# Patient Record
Sex: FEMALE | Race: WHITE | NOT HISPANIC OR LATINO | Employment: OTHER | URBAN - METROPOLITAN AREA
[De-identification: names, ages, dates, MRNs, and addresses within clinical notes are randomized per-mention and may not be internally consistent; named-entity substitution may affect disease eponyms.]

---

## 2021-01-01 ENCOUNTER — RECORDS - HEALTHEAST (OUTPATIENT)
Dept: ADMINISTRATIVE | Facility: CLINIC | Age: 86
End: 2021-01-01

## 2022-01-01 ENCOUNTER — APPOINTMENT (OUTPATIENT)
Dept: GENERAL RADIOLOGY | Facility: CLINIC | Age: 87
DRG: 085 | End: 2022-01-01
Attending: STUDENT IN AN ORGANIZED HEALTH CARE EDUCATION/TRAINING PROGRAM
Payer: COMMERCIAL

## 2022-01-01 ENCOUNTER — HEALTH MAINTENANCE LETTER (OUTPATIENT)
Age: 87
End: 2022-01-01

## 2022-01-01 ENCOUNTER — APPOINTMENT (OUTPATIENT)
Dept: RADIOLOGY | Facility: HOSPITAL | Age: 87
End: 2022-01-01
Attending: EMERGENCY MEDICINE
Payer: COMMERCIAL

## 2022-01-01 ENCOUNTER — APPOINTMENT (OUTPATIENT)
Dept: CT IMAGING | Facility: HOSPITAL | Age: 87
End: 2022-01-01
Attending: EMERGENCY MEDICINE
Payer: COMMERCIAL

## 2022-01-01 ENCOUNTER — HOSPITAL ENCOUNTER (EMERGENCY)
Facility: HOSPITAL | Age: 87
Discharge: HOME | End: 2022-02-02
Attending: EMERGENCY MEDICINE | Admitting: EMERGENCY MEDICINE
Payer: COMMERCIAL

## 2022-01-01 ENCOUNTER — APPOINTMENT (OUTPATIENT)
Dept: CT IMAGING | Facility: HOSPITAL | Age: 87
End: 2022-01-01
Payer: COMMERCIAL

## 2022-01-01 ENCOUNTER — HOSPITAL ENCOUNTER (EMERGENCY)
Facility: HOSPITAL | Age: 87
Discharge: ANOTHER HOSPITAL | End: 2022-05-15
Attending: EMERGENCY MEDICINE | Admitting: EMERGENCY MEDICINE
Payer: COMMERCIAL

## 2022-01-01 ENCOUNTER — HOSPITAL ENCOUNTER (INPATIENT)
Facility: CLINIC | Age: 87
LOS: 4 days | DRG: 085 | End: 2022-05-19
Attending: PSYCHIATRY & NEUROLOGY | Admitting: PSYCHIATRY & NEUROLOGY
Payer: COMMERCIAL

## 2022-01-01 VITALS
DIASTOLIC BLOOD PRESSURE: 57 MMHG | OXYGEN SATURATION: 100 % | SYSTOLIC BLOOD PRESSURE: 119 MMHG | TEMPERATURE: 97 F | WEIGHT: 148 LBS | RESPIRATION RATE: 16 BRPM | HEART RATE: 62 BPM

## 2022-01-01 VITALS
SYSTOLIC BLOOD PRESSURE: 110 MMHG | WEIGHT: 149.03 LBS | HEART RATE: 124 BPM | DIASTOLIC BLOOD PRESSURE: 51 MMHG | TEMPERATURE: 96.4 F | OXYGEN SATURATION: 93 % | RESPIRATION RATE: 24 BRPM

## 2022-01-01 VITALS
DIASTOLIC BLOOD PRESSURE: 74 MMHG | OXYGEN SATURATION: 99 % | RESPIRATION RATE: 35 BRPM | SYSTOLIC BLOOD PRESSURE: 158 MMHG | HEART RATE: 63 BPM | TEMPERATURE: 97.6 F

## 2022-01-01 DIAGNOSIS — I62.9 INTRACRANIAL HEMORRHAGE (H): ICD-10-CM
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
## 2022-01-01 DIAGNOSIS — S00.83XA CONTUSION OF FOREHEAD, INITIAL ENCOUNTER: ICD-10-CM

## 2022-01-01 DIAGNOSIS — T45.515A WARFARIN-INDUCED COAGULOPATHY (H): ICD-10-CM

## 2022-01-01 DIAGNOSIS — S09.90XA INJURY OF HEAD, INITIAL ENCOUNTER: ICD-10-CM

## 2022-01-01 DIAGNOSIS — D68.32 WARFARIN-INDUCED COAGULOPATHY (H): ICD-10-CM

## 2022-01-01 DIAGNOSIS — Z79.01 CHRONIC ANTICOAGULATION: ICD-10-CM

## 2022-01-01 LAB
ABO/RH(D): NORMAL
ALBUMIN SERPL-MCNC: 3.6 G/DL (ref 3.5–5)
ALBUMIN UR-MCNC: NEGATIVE MG/DL
ALP SERPL-CCNC: 97 U/L (ref 45–120)
ALT SERPL W P-5'-P-CCNC: 11 U/L (ref 0–45)
ANION GAP SERPL CALCULATED.3IONS-SCNC: 12 MMOL/L (ref 5–18)
ANTIBODY SCREEN: NEGATIVE
APPEARANCE UR: CLEAR
AST SERPL W P-5'-P-CCNC: 22 U/L (ref 0–40)
ATRIAL RATE - MUSE: 267 BPM
BASOPHILS # BLD AUTO: 0 10E3/UL (ref 0–0.2)
BASOPHILS NFR BLD AUTO: 0 %
BILIRUB SERPL-MCNC: 1.1 MG/DL (ref 0–1)
BILIRUB UR QL STRIP: NEGATIVE
BUN SERPL-MCNC: 11 MG/DL (ref 8–28)
CALCIUM SERPL-MCNC: 8.8 MG/DL (ref 8.5–10.5)
CHLORIDE BLD-SCNC: 102 MMOL/L (ref 98–107)
CO2 SERPL-SCNC: 21 MMOL/L (ref 22–31)
COLOR UR AUTO: ABNORMAL
CREAT BLD-MCNC: 0.8 MG/DL (ref 0.6–1.1)
CREAT SERPL-MCNC: 0.81 MG/DL (ref 0.6–1.1)
DIASTOLIC BLOOD PRESSURE - MUSE: 81 MMHG
EOSINOPHIL # BLD AUTO: 0.1 10E3/UL (ref 0–0.7)
EOSINOPHIL NFR BLD AUTO: 1 %
ERYTHROCYTE [DISTWIDTH] IN BLOOD BY AUTOMATED COUNT: 15.9 % (ref 10–15)
GFR SERPL CREATININE-BSD FRML MDRD: 70 ML/MIN/1.73M2
GFR SERPL CREATININE-BSD FRML MDRD: >60 ML/MIN/1.73M2
GLUCOSE BLD-MCNC: 120 MG/DL (ref 70–125)
GLUCOSE BLDC GLUCOMTR-MCNC: 118 MG/DL (ref 70–99)
GLUCOSE UR STRIP-MCNC: NEGATIVE MG/DL
HCT VFR BLD AUTO: 25.1 % (ref 35–47)
HGB BLD-MCNC: 13.9 G/DL (ref 11.7–15.7)
HGB BLD-MCNC: 8.1 G/DL (ref 11.7–15.7)
HGB UR QL STRIP: NEGATIVE
HOLD SPECIMEN: NORMAL
IMM GRANULOCYTES # BLD: 0.1 10E3/UL
IMM GRANULOCYTES NFR BLD: 1 %
INR PPP: 1.5 (ref 0.85–1.15)
INR PPP: 2.67 (ref 0.85–1.15)
INR PPP: 3.86 (ref 0.85–1.15)
INTERPRETATION ECG - MUSE: NORMAL
KETONES UR STRIP-MCNC: NEGATIVE MG/DL
LACTATE SERPL-SCNC: 2.3 MMOL/L (ref 0.7–2)
LEUKOCYTE ESTERASE UR QL STRIP: NEGATIVE
LYMPHOCYTES # BLD AUTO: 1.3 10E3/UL (ref 0.8–5.3)
LYMPHOCYTES NFR BLD AUTO: 14 %
MCH RBC QN AUTO: 29.7 PG (ref 26.5–33)
MCHC RBC AUTO-ENTMCNC: 32.3 G/DL (ref 31.5–36.5)
MCV RBC AUTO: 92 FL (ref 78–100)
MONOCYTES # BLD AUTO: 1 10E3/UL (ref 0–1.3)
MONOCYTES NFR BLD AUTO: 11 %
NEUTROPHILS # BLD AUTO: 7.2 10E3/UL (ref 1.6–8.3)
NEUTROPHILS NFR BLD AUTO: 73 %
NITRATE UR QL: NEGATIVE
NRBC # BLD AUTO: 0 10E3/UL
NRBC BLD AUTO-RTO: 0 /100
P AXIS - MUSE: 11 DEGREES
PH UR STRIP: 6 [PH] (ref 5–7)
PLATELET # BLD AUTO: 348 10E3/UL (ref 150–450)
POTASSIUM BLD-SCNC: 3 MMOL/L (ref 3.5–5)
PR INTERVAL - MUSE: NORMAL MS
PROT SERPL-MCNC: 6.6 G/DL (ref 6–8)
QRS DURATION - MUSE: 88 MS
QT - MUSE: 474 MS
QTC - MUSE: 546 MS
R AXIS - MUSE: 67 DEGREES
RBC # BLD AUTO: 2.73 10E6/UL (ref 3.8–5.2)
RBC URINE: <1 /HPF
SARS-COV-2 RNA RESP QL NAA+PROBE: NEGATIVE
SODIUM SERPL-SCNC: 135 MMOL/L (ref 136–145)
SP GR UR STRIP: 1.05 (ref 1–1.03)
SPECIMEN EXPIRATION DATE: NORMAL
SQUAMOUS EPITHELIAL: <1 /HPF
SYSTOLIC BLOOD PRESSURE - MUSE: 147 MMHG
T AXIS - MUSE: 9 DEGREES
TROPONIN I SERPL-MCNC: 0.02 NG/ML (ref 0–0.29)
UROBILINOGEN UR STRIP-MCNC: <2 MG/DL
VENTRICULAR RATE- MUSE: 80 BPM
WBC # BLD AUTO: 9.6 10E3/UL (ref 4–11)
WBC URINE: <1 /HPF

## 2022-01-01 PROCEDURE — 250N000011 HC RX IP 250 OP 636: Performed by: STUDENT IN AN ORGANIZED HEALTH CARE EDUCATION/TRAINING PROGRAM

## 2022-01-01 PROCEDURE — 250N000011 HC RX IP 250 OP 636: Performed by: EMERGENCY MEDICINE

## 2022-01-01 PROCEDURE — 40000276 ZZH STATISTIC RCP TIME ED VENT EA 10 MIN

## 2022-01-01 PROCEDURE — 258N000003 HC RX IP 258 OP 636: Performed by: EMERGENCY MEDICINE

## 2022-01-01 PROCEDURE — 999N000157 HC STATISTIC RCP TIME EA 10 MIN

## 2022-01-01 PROCEDURE — 120N000002 HC R&B MED SURG/OB UMMC

## 2022-01-01 PROCEDURE — 94002 VENT MGMT INPAT INIT DAY: CPT

## 2022-01-01 PROCEDURE — 85610 PROTHROMBIN TIME: CPT | Performed by: PHYSICIAN ASSISTANT

## 2022-01-01 PROCEDURE — 86901 BLOOD TYPING SEROLOGIC RH(D): CPT | Performed by: EMERGENCY MEDICINE

## 2022-01-01 PROCEDURE — 36415 COLL VENOUS BLD VENIPUNCTURE: CPT | Performed by: PHYSICIAN ASSISTANT

## 2022-01-01 PROCEDURE — 99221 1ST HOSP IP/OBS SF/LOW 40: CPT | Mod: AI | Performed by: PSYCHIATRY & NEUROLOGY

## 2022-01-01 PROCEDURE — 84484 ASSAY OF TROPONIN QUANT: CPT | Performed by: EMERGENCY MEDICINE

## 2022-01-01 PROCEDURE — 83605 ASSAY OF LACTIC ACID: CPT | Performed by: EMERGENCY MEDICINE

## 2022-01-01 PROCEDURE — 72125 CT NECK SPINE W/O DYE: CPT

## 2022-01-01 PROCEDURE — 71275 CT ANGIOGRAPHY CHEST: CPT

## 2022-01-01 PROCEDURE — 999N000065 XR CHEST PORT 1 VIEW

## 2022-01-01 PROCEDURE — 85018 HEMOGLOBIN: CPT | Performed by: PHYSICIAN ASSISTANT

## 2022-01-01 PROCEDURE — 96376 TX/PRO/DX INJ SAME DRUG ADON: CPT | Mod: 59

## 2022-01-01 PROCEDURE — 85025 COMPLETE CBC W/AUTO DIFF WBC: CPT | Performed by: EMERGENCY MEDICINE

## 2022-01-01 PROCEDURE — 200N000002 HC R&B ICU UMMC

## 2022-01-01 PROCEDURE — 71045 X-RAY EXAM CHEST 1 VIEW: CPT

## 2022-01-01 PROCEDURE — 999N000065 XR ABDOMEN PORT 1 VIEWS

## 2022-01-01 PROCEDURE — 96368 THER/DIAG CONCURRENT INF: CPT | Mod: 59

## 2022-01-01 PROCEDURE — 80053 COMPREHEN METABOLIC PANEL: CPT | Performed by: EMERGENCY MEDICINE

## 2022-01-01 PROCEDURE — 250N000009 HC RX 250: Performed by: STUDENT IN AN ORGANIZED HEALTH CARE EDUCATION/TRAINING PROGRAM

## 2022-01-01 PROCEDURE — 82565 ASSAY OF CREATININE: CPT

## 2022-01-01 PROCEDURE — 99292 CRITICAL CARE ADDL 30 MIN: CPT

## 2022-01-01 PROCEDURE — 96366 THER/PROPH/DIAG IV INF ADDON: CPT | Mod: 59

## 2022-01-01 PROCEDURE — 99221 1ST HOSP IP/OBS SF/LOW 40: CPT | Mod: GC | Performed by: NEUROLOGICAL SURGERY

## 2022-01-01 PROCEDURE — 70450 CT HEAD/BRAIN W/O DYE: CPT

## 2022-01-01 PROCEDURE — 96365 THER/PROPH/DIAG IV INF INIT: CPT | Mod: 59

## 2022-01-01 PROCEDURE — 99238 HOSP IP/OBS DSCHRG MGMT 30/<: CPT | Mod: GC | Performed by: HOSPITALIST

## 2022-01-01 PROCEDURE — 99222 1ST HOSP IP/OBS MODERATE 55: CPT | Performed by: FAMILY MEDICINE

## 2022-01-01 PROCEDURE — C9803 HOPD COVID-19 SPEC COLLECT: HCPCS

## 2022-01-01 PROCEDURE — 51702 INSERT TEMP BLADDER CATH: CPT

## 2022-01-01 PROCEDURE — 99285 EMERGENCY DEPT VISIT HI MDM: CPT | Mod: 25

## 2022-01-01 PROCEDURE — 96375 TX/PRO/DX INJ NEW DRUG ADDON: CPT | Mod: 59

## 2022-01-01 PROCEDURE — 99232 SBSQ HOSP IP/OBS MODERATE 35: CPT | Mod: GC | Performed by: HOSPITALIST

## 2022-01-01 PROCEDURE — 250N000015 HC RX FACTOR IP MED 636 OP 636: Performed by: EMERGENCY MEDICINE

## 2022-01-01 PROCEDURE — 99231 SBSQ HOSP IP/OBS SF/LOW 25: CPT | Mod: GC | Performed by: HOSPITALIST

## 2022-01-01 PROCEDURE — 81001 URINALYSIS AUTO W/SCOPE: CPT | Performed by: EMERGENCY MEDICINE

## 2022-01-01 PROCEDURE — 74174 CTA ABD&PLVS W/CONTRAST: CPT

## 2022-01-01 PROCEDURE — 99291 CRITICAL CARE FIRST HOUR: CPT | Mod: 25

## 2022-01-01 PROCEDURE — 36415 COLL VENOUS BLD VENIPUNCTURE: CPT | Performed by: EMERGENCY MEDICINE

## 2022-01-01 PROCEDURE — 93005 ELECTROCARDIOGRAM TRACING: CPT | Mod: 59 | Performed by: EMERGENCY MEDICINE

## 2022-01-01 PROCEDURE — 99233 SBSQ HOSP IP/OBS HIGH 50: CPT | Performed by: FAMILY MEDICINE

## 2022-01-01 PROCEDURE — 5A1935Z RESPIRATORY VENTILATION, LESS THAN 24 CONSECUTIVE HOURS: ICD-10-PCS | Performed by: PSYCHIATRY & NEUROLOGY

## 2022-01-01 PROCEDURE — 87635 SARS-COV-2 COVID-19 AMP PRB: CPT | Performed by: EMERGENCY MEDICINE

## 2022-01-01 PROCEDURE — 85610 PROTHROMBIN TIME: CPT | Performed by: EMERGENCY MEDICINE

## 2022-01-01 PROCEDURE — 74018 RADEX ABDOMEN 1 VIEW: CPT | Mod: 26 | Performed by: STUDENT IN AN ORGANIZED HEALTH CARE EDUCATION/TRAINING PROGRAM

## 2022-01-01 PROCEDURE — 99231 SBSQ HOSP IP/OBS SF/LOW 25: CPT | Mod: GC | Performed by: INTERNAL MEDICINE

## 2022-01-01 PROCEDURE — 250N000009 HC RX 250: Performed by: EMERGENCY MEDICINE

## 2022-01-01 RX ORDER — ETOMIDATE 2 MG/ML
20 INJECTION INTRAVENOUS ONCE
Status: COMPLETED | OUTPATIENT
Start: 2022-01-01 | End: 2022-01-01

## 2022-01-01 RX ORDER — DEXTROSE MONOHYDRATE 25 G/50ML
25-50 INJECTION, SOLUTION INTRAVENOUS
Status: DISCONTINUED | OUTPATIENT
Start: 2022-01-01 | End: 2022-01-01 | Stop reason: HOSPADM

## 2022-01-01 RX ORDER — LORAZEPAM 2 MG/ML
1 INJECTION INTRAMUSCULAR EVERY 10 MIN PRN
Status: DISCONTINUED | OUTPATIENT
Start: 2022-01-01 | End: 2022-01-01 | Stop reason: HOSPADM

## 2022-01-01 RX ORDER — ONDANSETRON 2 MG/ML
4 INJECTION INTRAMUSCULAR; INTRAVENOUS ONCE
Status: COMPLETED | OUTPATIENT
Start: 2022-01-01 | End: 2022-01-01

## 2022-01-01 RX ORDER — GLYCOPYRROLATE 0.2 MG/ML
0.1 INJECTION, SOLUTION INTRAMUSCULAR; INTRAVENOUS EVERY 4 HOURS PRN
Status: DISCONTINUED | OUTPATIENT
Start: 2022-01-01 | End: 2022-01-01 | Stop reason: HOSPADM

## 2022-01-01 RX ORDER — FENTANYL CITRATE 50 UG/ML
50 INJECTION, SOLUTION INTRAMUSCULAR; INTRAVENOUS ONCE
Status: COMPLETED | OUTPATIENT
Start: 2022-01-01 | End: 2022-01-01

## 2022-01-01 RX ORDER — NICOTINE POLACRILEX 4 MG
15-30 LOZENGE BUCCAL
Status: DISCONTINUED | OUTPATIENT
Start: 2022-01-01 | End: 2022-01-01 | Stop reason: HOSPADM

## 2022-01-01 RX ORDER — IOPAMIDOL 755 MG/ML
100 INJECTION, SOLUTION INTRAVASCULAR ONCE
Status: COMPLETED | OUTPATIENT
Start: 2022-01-01 | End: 2022-01-01

## 2022-01-01 RX ORDER — PROPOFOL 10 MG/ML
5-75 INJECTION, EMULSION INTRAVENOUS CONTINUOUS
Status: DISCONTINUED | OUTPATIENT
Start: 2022-01-01 | End: 2022-01-01

## 2022-01-01 RX ORDER — PROPOFOL 10 MG/ML
5-75 INJECTION, EMULSION INTRAVENOUS CONTINUOUS
Status: DISCONTINUED | OUTPATIENT
Start: 2022-01-01 | End: 2022-01-01 | Stop reason: HOSPADM

## 2022-01-01 RX ORDER — MORPHINE SULFATE 2 MG/ML
1-2 INJECTION, SOLUTION INTRAMUSCULAR; INTRAVENOUS
Status: DISCONTINUED | OUTPATIENT
Start: 2022-01-01 | End: 2022-01-01

## 2022-01-01 RX ORDER — PROPOFOL 10 MG/ML
50 INJECTION, EMULSION INTRAVENOUS ONCE
Status: COMPLETED | OUTPATIENT
Start: 2022-01-01 | End: 2022-01-01

## 2022-01-01 RX ORDER — FENTANYL CITRATE 50 UG/ML
100 INJECTION, SOLUTION INTRAMUSCULAR; INTRAVENOUS ONCE
Status: COMPLETED | OUTPATIENT
Start: 2022-01-01 | End: 2022-01-01

## 2022-01-01 RX ORDER — GLYCOPYRROLATE 0.2 MG/ML
0.2 INJECTION, SOLUTION INTRAMUSCULAR; INTRAVENOUS
Status: COMPLETED | OUTPATIENT
Start: 2022-01-01 | End: 2022-01-01

## 2022-01-01 RX ADMIN — FENTANYL CITRATE 100 MCG: 50 INJECTION, SOLUTION INTRAMUSCULAR; INTRAVENOUS at 21:47

## 2022-01-01 RX ADMIN — Medication 2 MG/HR: at 08:37

## 2022-01-01 RX ADMIN — PHYTONADIONE 10 MG: 10 INJECTION, EMULSION INTRAMUSCULAR; INTRAVENOUS; SUBCUTANEOUS at 20:52

## 2022-01-01 RX ADMIN — FENTANYL CITRATE 100 MCG: 50 INJECTION, SOLUTION INTRAMUSCULAR; INTRAVENOUS at 21:26

## 2022-01-01 RX ADMIN — Medication 2 MG/HR: at 06:16

## 2022-01-01 RX ADMIN — IOPAMIDOL 100 ML: 755 INJECTION, SOLUTION INTRAVENOUS at 20:19

## 2022-01-01 RX ADMIN — LEVETIRACETAM 1342 MG: 100 INJECTION, SOLUTION INTRAVENOUS at 21:12

## 2022-01-01 RX ADMIN — ETOMIDATE 20 MG: 2 INJECTION INTRAVENOUS at 20:48

## 2022-01-01 RX ADMIN — FENTANYL CITRATE 50 MCG: 50 INJECTION, SOLUTION INTRAMUSCULAR; INTRAVENOUS at 20:52

## 2022-01-01 RX ADMIN — PROTHROMBIN, COAGULATION FACTOR VII HUMAN, COAGULATION FACTOR IX HUMAN, COAGULATION FACTOR X HUMAN, PROTEIN C, PROTEIN S HUMAN, AND WATER 1656 UNITS: KIT at 21:01

## 2022-01-01 RX ADMIN — GLYCOPYRROLATE 0.2 MG: 0.2 INJECTION INTRAMUSCULAR; INTRAVENOUS at 08:01

## 2022-01-01 RX ADMIN — PROPOFOL 50 MG: 10 INJECTION, EMULSION INTRAVENOUS at 21:24

## 2022-01-01 RX ADMIN — ONDANSETRON 4 MG: 2 INJECTION INTRAMUSCULAR; INTRAVENOUS at 19:40

## 2022-01-01 RX ADMIN — PROPOFOL 10 MCG/KG/MIN: 10 INJECTION, EMULSION INTRAVENOUS at 00:10

## 2022-01-01 RX ADMIN — PROPOFOL 5 MCG/KG/MIN: 10 INJECTION, EMULSION INTRAVENOUS at 20:54

## 2022-01-01 ASSESSMENT — ACTIVITIES OF DAILY LIVING (ADL)
ADLS_ACUITY_SCORE: 49
ADLS_ACUITY_SCORE: 49
CHANGE_IN_FUNCTIONAL_STATUS_SINCE_ONSET_OF_CURRENT_ILLNESS/INJURY: NO
CONCENTRATING,_REMEMBERING_OR_MAKING_DECISIONS_DIFFICULTY: YES
ADLS_ACUITY_SCORE: 49
DIFFICULTY_EATING/SWALLOWING: NO
ADLS_ACUITY_SCORE: 49
EQUIPMENT_CURRENTLY_USED_AT_HOME: OTHER (SEE COMMENTS)
ADLS_ACUITY_SCORE: 47
ADLS_ACUITY_SCORE: 45
ADLS_ACUITY_SCORE: 49
ADLS_ACUITY_SCORE: 45
TOILETING_ISSUES: NO
ADLS_ACUITY_SCORE: 45
ADLS_ACUITY_SCORE: 45
ADLS_ACUITY_SCORE: 49
ADLS_ACUITY_SCORE: 45
ADLS_ACUITY_SCORE: 49
WALKING_OR_CLIMBING_STAIRS_DIFFICULTY: YES
ADLS_ACUITY_SCORE: 49
ADLS_ACUITY_SCORE: 45
ADLS_ACUITY_SCORE: 45
WEAR_GLASSES_OR_BLIND: NO
ADLS_ACUITY_SCORE: 45
ADLS_ACUITY_SCORE: 47
ADLS_ACUITY_SCORE: 45
NUMBER_OF_TIMES_PATIENT_HAS_FALLEN_WITHIN_LAST_SIX_MONTHS: 2
ADLS_ACUITY_SCORE: 49
ADLS_ACUITY_SCORE: 49
DOING_ERRANDS_INDEPENDENTLY_DIFFICULTY: YES
ADLS_ACUITY_SCORE: 45
FALL_HISTORY_WITHIN_LAST_SIX_MONTHS: YES
ADLS_ACUITY_SCORE: 47
ADLS_ACUITY_SCORE: 49
ADLS_ACUITY_SCORE: 45
ADLS_ACUITY_SCORE: 47
DRESSING/BATHING: BATHING DIFFICULTY, REQUIRES EQUIPMENT;DRESSING DIFFICULTY, REQUIRES EQUIPMENT
ADLS_ACUITY_SCORE: 49
DRESSING/BATHING_DIFFICULTY: YES
ADLS_ACUITY_SCORE: 49
ADLS_ACUITY_SCORE: 45
ADLS_ACUITY_SCORE: 49
ADLS_ACUITY_SCORE: 49

## 2022-01-01 ASSESSMENT — VISUAL ACUITY
OU: OTHER (SEE COMMENT)

## 2022-02-02 NOTE — ED NOTES
Bed: JN-  Expected date: 2/2/22  Expected time:   Means of arrival: Ambulance  Comments:  Jin  86 F  Fall/+thinners

## 2022-02-02 NOTE — ED PROVIDER NOTES
Emergency Department Midlevel Supervisory Note     I personally saw the patient and performed a substantive portion of the visit including all aspects of the medical decision making.    ED Course:  5:29 PM Jazmine Miner PA-C staffed patient with me. I agree with their assessment and plan of management, and I will see the patient.  5:45 PM I met with the patient to introduce myself, gather additional history, perform my initial exam, and discuss the plan.      Brief HPI:     Zenaida Brown is a 86 year old female who presents for evaluation of a fall.     Approximately 30-45 minutes prior to arrival, EMS reports that there was a mechanical fall in the bathroom. The patient was leaning forwards and then fell onto the shower floor, hitting the top of her head. She has been able to ambulate since her fall. No witness. No believed loss of consciousness. The patient is on anticoagulation. Patient has dementia and per her , she is at her baseline.     I, Meenakshi Brown, am serving as a scribe to document services personally performed by Jo Aguirre DO, based on my observations and the provider's statements to me.   I, Jo Aguirre DO, attest that Meenakshi Brown was acting in a scribe capacity, has observed my performance of the services and has documented them in accordance with my direction.    Brief Physical Exam:   Constitutional:  Alert, in no acute distress  EYES: Conjunctivae clear  HENT:  Normocephalic. Hematoma to the right frontal bone.   Respiratory:  Respirations even, unlabored, in no acute respiratory distress  Cardiovascular:  Regular rate and rhythm, good peripheral perfusion  GI: Soft, nondistended, nontender, no palpable masses, no rebound, no guarding   Musculoskeletal:  No edema. No cyanosis. Range of motion major extremities intact. Cervical collar in place.  Integument: Warm, Dry, No erythema, No rash.   Neurologic:  Alert & oriented  Psych: Normal mood and affect       MDM:  Patient  presenting for evaluation of a fall on chronic anticoagulation.  History obtained from patient and .  She was walking to the bathroom, supposed to use her walker at all times, left it at the door.  She went to sit on the toilet and fell forward.  She denies any loss of consciousness, dizziness, chest pain, shortness of a prior to her fall.  Imaging of her head and neck were obtained and were negative for intracranial hemorrhage, fracture or other concerns.  She remained at her baseline while in the ED, ambulatory, discharged with expectant management and return precautions.       1. Chronic anticoagulation    2. Fall, initial encounter    3. Contusion of forehead, initial encounter    4. Injury of head, initial encounter        Labs and Imaging:  Results for orders placed or performed during the hospital encounter of 02/02/22   Head CT w/o contrast    Impression    IMPRESSION:    HEAD CT:  1. Right frontal scalp soft tissue contusion/hematoma without acute intracranial abnormality.    CERVICAL SPINE CT:  1. No acute traumatic injury of the cervical spine.    Cervical spine CT w/o contrast    Impression    IMPRESSION:    HEAD CT:  1. Right frontal scalp soft tissue contusion/hematoma without acute intracranial abnormality.    CERVICAL SPINE CT:  1. No acute traumatic injury of the cervical spine.    Result Value Ref Range    INR 2.67 (H) 0.85 - 1.15   Result Value Ref Range    Hemoglobin 13.9 11.7 - 15.7 g/dL   Extra Red Top Tube   Result Value Ref Range    Hold Specimen JIC    Extra Green Top (Lithium Heparin) Tube   Result Value Ref Range    Hold Specimen JIC    Extra Purple Top Tube   Result Value Ref Range    Hold Specimen JIC      I have reviewed the relevant laboratory and radiology studies    Procedures:  I was present for the key portions of this procedure: none      DO EMILI Xiong [facility] EMERGENCY DEPARTMENT  [address] [place] [address]  [id number]  [phone]     Jo Aguirre,   02/02/22 2118

## 2022-02-02 NOTE — ED PROVIDER NOTES
EMERGENCY DEPARTMENT ENCOUNTER      NAME: Zenaida Brown  AGE: 86 year old female  YOB: 1935  MRN: [id number]  EVALUATION DATE & TIME: 2022  5:02 PM    PCP: Sharron Howard    ED PROVIDER: Jazmine Miner PA-C      Chief Complaint   Patient presents with     Fall         FINAL IMPRESSION:  1. Chronic anticoagulation    2. Fall, initial encounter    3. Contusion of forehead, initial encounter    4. Injury of head, initial encounter          ED COURSE & MEDICAL DECISION MAKIN:06 PM I introduced myself to patient, performed initial HPI and examination.   6:19 PM Discussed imaging, C-collar removed. Discussed plan for discharge with patient and .    Zenaida Brown is a 86 year old female with a PMH of atrial fibrillation on warfarin, CHF, dementia here for evaluation after a fall.      History suggests a mechanical fall.  Patient will not need an evaluation for medical causes of fall on this visit.  History and exam are concerning for the following possible injuries:      Yes  Head Injury / Concussion - Did hit head. Large contusion to forehead. Small abrasion overlying but no active bleeding and no indication for repair. Patient has a history of dementia, at baseline per . On chronic anticoagulation (Warfarin), INR therapeutic (2.67). CT head with no intracranial hemorrhage. Contusion seen without other acute findings. CT c-spine negative.  No  Intra-abdominal injury  No  Intra-thoracic injury  No  Spinal Fracture  No  Extremity Fracture  No  Pelvic / Hip Fracture - Ambulatory with walker or assist of one per baseline.  No  Spinal Cord injury    Work-up is reassuring. Patient's mentation is at baseline. Lives at home independently with . Instructed on at home management with ice, close monitoring. Encouraged close follow up with PCP and instructed on red flags/indications to return to the emergency department. All questions were answered to the best of my ability and  patient/ are agreeable with plan.     MEDICATIONS GIVEN IN THE EMERGENCY:  Medications - No data to display    NEW PRESCRIPTIONS STARTED AT TODAY'S ER VISIT  [unfilled]       =================================================================    HPI    Patient information was obtained from: EMS, patient and     Use of : N/A         Zenaida Brown is a 86 year old female with a pertinent history of CHF, CAD, cardiac pacemaker, chronic atrial fibrillation, hypertension, and GERD who presents to this ED by ambulance for evaluation of fall.     The patient has dementia and per her , she is at her baseline.    Approximately 30-45 minutes prior to arrival, EMS reports that there was a mechanical fall in the bathroom. The patient was leaning forwards and then fell onto the shower floor, hitting the top of her head. She has been able to ambulate since her fall. No witness. No believed loss of consciousness. The patient is on anticoagulation. No symptoms or complaints at this time. The patient's  is on his way.     REVIEW OF SYSTEMS   Review of Systems   Unable to perform ROS: Dementia        PAST MEDICAL HISTORY:  History reviewed. No pertinent past medical history.    PAST SURGICAL HISTORY:  History reviewed. No pertinent surgical history.    CURRENT MEDICATIONS:    acetaminophen-codeine (TYLENOL #3) 300-30 mg per tablet        ALLERGIES:  No Known Allergies    FAMILY HISTORY:  History reviewed. No pertinent family history.    SOCIAL HISTORY:   Social History     Socioeconomic History     Marital status:      Spouse name: Not on file     Number of children: Not on file     Years of education: Not on file     Highest education level: Not on file   Occupational History     Not on file   Tobacco Use     Smoking status: Former Smoker     Smokeless tobacco: Never Used   Substance and Sexual Activity     Alcohol use: Not Currently     Drug use: Never     Sexual activity: Not on  file   Other Topics Concern     Not on file   Social History Narrative     Not on file     Social Determinants of Health     Financial Resource Strain: Not on file   Food Insecurity: Not on file   Transportation Needs: Not on file   Physical Activity: Not on file   Stress: Not on file   Social Connections: Not on file   Intimate Partner Violence: Not on file   Housing Stability: Not on file       VITALS:  BP (!) 158/74   Pulse 63   Temp 97.6  F (36.4  C)   Resp (!) 35   SpO2 99%     PHYSICAL EXAM    Constitutional: Well developed, Well nourished, NAD, GCS 15   HENT: Normocephalic, large contusion to forehead with central abrasion. No laceration or active bleeding.   Neck- In C-collar. No reproducible c-spine tenderness.   Eyes: Conjunctiva normal. PERRL. EOM intact. No photophobia or nystagmsu.  Respiratory: No respiratory distress, speaking in full sentences. Normal breath sounds, No wheezing  Cardiovascular: Normal heart rate, Regular rhythm, No murmurs. Chest wall nontender.    GI: Soft, nontender.     Musculoskeletal: No deformities, Moves all extremities equally. No thoracic or lumbar spine tenderness. Ambulatory with assist of one/walker per baseline.  Integument: Warm, Dry, No erythema, ecchymosis, or rash.  Neurologic: Alert & oriented to self and place; dementia at baseline, Normal sensory function. No focal deficits. Cranial nerves II-XII intact.   Psychiatric: Affect normal, Judgment normal, Mood normal. Cooperative.      LAB:  All pertinent labs reviewed and interpreted.  Results for orders placed or performed during the hospital encounter of 02/02/22   Head CT w/o contrast    Impression    IMPRESSION:    HEAD CT:  1. Right frontal scalp soft tissue contusion/hematoma without acute intracranial abnormality.    CERVICAL SPINE CT:  1. No acute traumatic injury of the cervical spine.    Cervical spine CT w/o contrast    Impression    IMPRESSION:    HEAD CT:  1. Right frontal scalp soft tissue  contusion/hematoma without acute intracranial abnormality.    CERVICAL SPINE CT:  1. No acute traumatic injury of the cervical spine.    Result Value Ref Range    INR 2.67 (H) 0.85 - 1.15   Result Value Ref Range    Hemoglobin 13.9 11.7 - 15.7 g/dL   Extra Red Top Tube   Result Value Ref Range    Hold Specimen JIC    Extra Green Top (Lithium Heparin) Tube   Result Value Ref Range    Hold Specimen JIC    Extra Purple Top Tube   Result Value Ref Range    Hold Specimen JIC        RADIOLOGY:  Reviewed all pertinent imaging. Please see official radiology report.  Cervical spine CT w/o contrast   Final Result   IMPRESSION:      HEAD CT:   1. Right frontal scalp soft tissue contusion/hematoma without acute intracranial abnormality.      CERVICAL SPINE CT:   1. No acute traumatic injury of the cervical spine.       Head CT w/o contrast   Final Result   IMPRESSION:      HEAD CT:   1. Right frontal scalp soft tissue contusion/hematoma without acute intracranial abnormality.      CERVICAL SPINE CT:   1. No acute traumatic injury of the cervical spine.           EKG:    None    PROCEDURES:   None      IAdan Cha, am serving as a scribe to document services personally performed by Jazmine Miner PA-C based on my observation and the provider's statements to me. IJazmine PA-C, attest that Adan Joshua is acting in a scribe capacity, has observed my performance of the services and has documented them in accordance with my direction.    Jazmine Miner PA-C  Emergency Medicine  [facility] EMERGENCY DEPARTMENT  [address] [place] [id number]  [phone]             Jazmine Miner PA-C  02/02/22 7391

## 2022-02-02 NOTE — ED TRIAGE NOTES
Fell at home while trying to stand from toilet. Pt fell from standing position forward hitting her head on the base of a flush to the floor shower. Pt is on coumadin. EMS placed cervical collar on. Pt has large size swelling and bruise on right side of head. Pt has small laceration over swelling. At baseline patient is confused with known dementia.  is primary care giver. EMS reports that  and Patient were at an  today filling out POA paperwork.

## 2022-02-03 NOTE — ED NOTES
Plan to discharge home.  educated about signs of concussion and when to come back in. Education provided. No questions at this time.

## 2022-02-03 NOTE — DISCHARGE INSTRUCTIONS
CT of the head and neck are reassuring.  June's INR is 2.67 today.    Use ice for pain and swelling.  You can use tylenol as needed for pain.    Continue to monitor symptoms at home.  Follow up in clinic on Friday for re-check.    Return to the emergency department if you are noticing fevers, increase confusion, new weakness, vomiting, neck stiffness, or any other concerning symptoms. We would be happy to see you.

## 2022-05-15 PROBLEM — S06.5XAA SDH (SUBDURAL HEMATOMA) (H): Status: ACTIVE | Noted: 2022-01-01

## 2022-05-16 NOTE — PROGRESS NOTES
Major Shift Events:  Pt extubated @ 0850 for comfort cares. More sleepy throughout day and currently not arousing to voice. O2 sats 60-70s. HR 120s. Morphine gtt @ 2. Family at bedside.  Plan: continue comfort cares.  For vital signs and complete assessments, please see documentation flowsheets.

## 2022-05-16 NOTE — CONSULTS
"[facility]       NEUROSURGERY CONSULTATION NOTE    This patient was directly admitted by Dr. Rocha from the Neurocritical Care service.    Reason for Consultation  Acute 2.4 cm left convexity subdural hematoma    HPI:  Zenaida Brown is a 86 year old female (DNR) with history of Alzheimer's dementia, CAD, afib and mitral valve replacement on warfarin, and hypertension who presented to [facility] ED after falling in the shower.  While at [facility], head CT was obtained demonstrating 2.4 cm left convexity subdural hematoma.  Ranulfo Mendez and Aj of [facility] were contacted, and Dr. Rocha directly admitted the patient to the  Neuro ICU for further cares.  The patient demonstrated \"increased obtundation\" at [facility], prompting intubation prior to transfer.    At the time of evaluation, the patient is intubated and examined off of sedation.  She opens eyes to noxious stimulus, localizes the LUE, and withdraws the right hemibody and LLE.    The patient's POA is her daughter Janis (number in chart).  Janis confirms that the patient would not wish to have chest compressions or to undergo drastic surgical measures.    INR was 3.86 on presentation to [facility] and was corrected with vitamin K.  Last INR at 2111 was 1.50.    PAST MEDICAL HISTORY: No past medical history on file.    PAST SURGICAL HISTORY: No past surgical history on file.    FAMILY HISTORY: No family history on file.    SOCIAL HISTORY:   Social History     Tobacco Use    Smoking status: Former Smoker    Smokeless tobacco: Never Used   Substance Use Topics    Alcohol use: Not Currently       MEDICATIONS:  No current outpatient medications on file.       Allergies:  No Known Allergies    ROS: 10 point ROS of systems including Constitutional, Eyes, Respiratory, Cardiovascular, Gastroenterology, Genitourinary, Integumentary, Muscularskeletal, Psychiatric were all negative except for pertinent positives noted in " my HPI.    Physical exam:   Blood pressure 116/52, pulse 59, temperature (!) 96.4  F (35.8  C), temperature source Axillary, resp. rate 16, weight 67.6 kg (149 lb 0.5 oz), SpO2 100 %.  CV: RRR, paced on telemetry  PULM: intubated, mechanically ventilated  ABD: soft, non-distended  NEUROLOGIC:  - GCS 8 (E2 Vt M5)  - Initially sedated with propofol @ 10/hr on arrival. Examined off sedation.  - PERRL, +cough, +corneal  - Localizes LUE  - Withdraws LLE  - Withdraws RUE and RLE      IMAGING:  CT head 5/15/22:  1.  Acute left holohemispheric subdural hemorrhage measuring up to 24 mm in diameter with areas of internal hyperattenuation suggesting continued active hemorrhage. Associated mass effect produces subtotal effacement left lateral and third ventricles,   early left temporal uncal herniation, and 7 mm rightward midline shift.  2.  Acute bilateral tentorial subdural hematomas within the left posterior parafalcine extension.  3.  Small acute subarachnoid hemorrhage over the right lateral temporal sulci.  4.  No acute calvarial fracture or scalp hematoma.    CT cervical spine 5/15/22:  1.  No evidence for acute fracture or posttraumatic subluxation of the cervical spine by CT imaging.  2.  No high-grade spinal canal or neural foraminal stenosis.  3.  Mild pulmonary emphysema.    CTA CAP 5/15/22:  1.  No acute traumatic visceral, vascular, or acute osseous abnormality.  2.  Emphysema.  3.  Moderately enlarged heart status post median sternotomy.  4.  Diverticulosis. No diverticulitis, colitis, or obstruction.  5.  Right hepatic cyst, no further characterization or follow-up required.    LABS:   Lab Results   Component Value Date    WBC 9.6 05/15/2022     Lab Results   Component Value Date    RBC 2.73 05/15/2022     Lab Results   Component Value Date    HGB 8.1 05/15/2022     Lab Results   Component Value Date    HCT 25.1 05/15/2022     Lab Results   Component Value Date    MCV 92 05/15/2022     Lab Results   Component  Value Date    MCH 29.7 05/15/2022     Lab Results   Component Value Date    MCHC 32.3 05/15/2022     Lab Results   Component Value Date    RDW 15.9 05/15/2022     Lab Results   Component Value Date     05/15/2022       Last Comprehensive Metabolic Panel:  Sodium   Date Value Ref Range Status   05/15/2022 135 (L) 136 - 145 mmol/L Final     Potassium   Date Value Ref Range Status   05/15/2022 3.0 (L) 3.5 - 5.0 mmol/L Final     Chloride   Date Value Ref Range Status   05/15/2022 102 98 - 107 mmol/L Final     Carbon Dioxide (CO2)   Date Value Ref Range Status   05/15/2022 21 (L) 22 - 31 mmol/L Final     Anion Gap   Date Value Ref Range Status   05/15/2022 12 5 - 18 mmol/L Final     Glucose   Date Value Ref Range Status   05/15/2022 120 70 - 125 mg/dL Final     GLUCOSE BY METER POCT   Date Value Ref Range Status   05/15/2022 118 (H) 70 - 99 mg/dL Final     Urea Nitrogen   Date Value Ref Range Status   05/15/2022 11 8 - 28 mg/dL Final     Creatinine   Date Value Ref Range Status   05/15/2022 0.81 0.60 - 1.10 mg/dL Final     Creatinine POCT   Date Value Ref Range Status   05/15/2022 0.8 0.6 - 1.1 mg/dL Final     GFR Estimate   Date Value Ref Range Status   05/15/2022 70 >60 mL/min/1.73m2 Final     Comment:     Effective December 21, 2021 eGFRcr in adults is calculated using the 2021 CKD-EPI creatinine equation which includes age and gender (Jojo et al., NEJM, DOI: 10.1056/HOMAjq5667766)   01/08/2021 54 (L) >60 mL/min/1.73m2 Final     GFR, ESTIMATED POCT   Date Value Ref Range Status   05/15/2022 >60 >60 mL/min/1.73m2 Final     Calcium   Date Value Ref Range Status   05/15/2022 8.8 8.5 - 10.5 mg/dL Final       INR   Date Value Ref Range Status   05/15/2022 1.50 (H) 0.85 - 1.15 Final      No results found for: PTT     ASSESSMENT:  86 year old DNR female with history of afib and MVR on warfarin (INR 3.86 -> 1.50), Alzheimer's dementia, and hypertension presenting after fall in shower with 2.4 cm left subdural  hematoma.      In discussion with the patient's POA (daughter Janis), their family is in agreement that surgery for subdural hematoma evacuation should not be pursued at this time.  I extensively discussed the risks, benefits, and alternatives with them, and they wish to pursue medical management at this time.    RECOMMENDATIONS:  - No neurosurgical intervention at this time per family wishes  - Frequent neuro checks  - SBP <140  - INR <1.5, platelets >100k  - Repeat head CT 6 hours after time of initial scan  - AED dosing per NCC  - No anticoagulating/antiplatelet/fibrinolytic medications  - Avoid sedating medications that would alter a neurological examination    The patient was discussed with Dr. Freire, neurosurgery chief resident, and he agrees with the above.    Kurtis Ruiz M.D.  Neurosurgery Resident, PGY-3    Please contact neurosurgery resident on call with questions.    Dial * * [phone], enter [phone] when prompted.       Comorbid conditions:  Clinically Significant Risk Factors Present on Admission              # Coagulation Defect: INR = 1.50 (Ref range: 0.85 - 1.15) and/or PTT = N/A on admission, will monitor for bleeding   # Coma: based on GCS score of <8    # Compression of brain   I have seen this patient with the resident and formulated a plan and agree with this note.  AMP

## 2022-05-16 NOTE — ED NOTES
Patient brought monitored to CT by writer. Patient experienced some dry heaving, patient suctioned. Patient slightly agitated during exam, writer remained in CT room with patient with lead gown, able to redirect and comfort patient.

## 2022-05-16 NOTE — SIGNIFICANT EVENT
SPIRITUAL HEALTH SERVICES Significant Event  [employer] Sacrament of ANOINTING  [facility] 4a    Pt anointed by Father Renata Caba   Pager [phone]

## 2022-05-16 NOTE — ED PROVIDER NOTES
EMERGENCY DEPARTMENT NOTE     Name: Zenaida Brown    Age/Sex: 86 year old female   MRN: [id number]   Evaluation Date & Time:  5/15/2022  7:30 PM    PCP:    Sharron Howard   ED Provider: Obi Celis D.O.       CHIEF COMPLAINT    Fall       DIAGNOSIS & DISPOSITION     1. Intracranial hemorrhage (H)    2. Warfarin-induced coagulopathy (H)      DISPOSITION: Transfer to the [facility] intensive care unit/U of  intensivist and neurosurgy    At the conclusion of the encounter I discussed the results of all of the tests and the disposition. The questions were answered. The patient or family acknowledged understanding and was agreeable with the care plan.    TOTAL CRITICAL CARE TIME (EXCLUDING PROCEDURES): 90minutes    PROCEDURES:     PROCEDURE: Rapid Sequence Intubation   INDICATIONS: Respiratory Failure and Airway Protection   PROCEDURE PROVIDER: Dr Obi Celis   CONSENT: Risks, benefits and alternatives were discussed with and Verbal consent was obtained from Spouse.   PROCEDURE SPECIFIC CHECKLIST COMPLETED: Yes   TIME OUT: Universal protocol was followed. TIME OUT conducted just prior to starting procedure confirmed patient identity, site/side, procedure, patient position, and availability of correct equipment. Yes   MEDICATIONS: Etomidate, 20 mg, IV and Succinylcholine, 200 mg IV   TUBE DETAILS: 7.5 tube, at 25 cm at the teeth   EQUIPMENT USED: Glidescope, size 4   POST-INTUBATION ASSESSMENT/NOTE: Difficulty of intubation:  Easy, straightforward    Post-intubation pulmonary exam:  equal BS and absent over the epigastrium    ET Tube placement was confirmed with:  auscultation with good, equal bilateral breath sounds, absence of breath sounds over the epigastrium, fog in the tube, colorimetric CO2 detector (good color change) and pulse oximeter readings stable or improving    Lowest oxygen saturation was 99%    Monitoring consisted of:  heart rate, cardiac monitor, continuous pulse oximeter,  frequent blood pressure checks, IV access, constant attendance by RN until patient is recovered and constant attendance by MD until patient is stable     COMPLICATIONS: Patient tolerated procedure well, without complication       EMERGENCY DEPARTMENT COURSE/MEDICAL DECISION MAKING   7:30 PM I met with the patient to gather history and to perform my initial exam.  We discussed treatment options and the plan for care while in the Emergency Department.    8:40 PM I spoke with Dr. Mendez, Neurosurgery.    8:55 PM I spoke with Dr. Rocha, Neuro Intensivist.    Zenaida Brown is a 86 year old female with relevant past history of chronic atrial fibrillation, congestive heart failure, cardiac pacemaker, hypertension and coronary artery disease, who presents to the emergency department for evaluation after  a fall.    Per the patient's daughter, the patient was in the shower when she had a fall. The patient's  then helped her to their room where she fell again. The patient's  called the  to help pick her up. Per the , initially the patient appeared fine and shortly after the patient developed chest pain and nausea. In the ED the patient is heaving and vomiting. Patient is on warfarin.    Triage note reviewed:   Vital signs:BP (!) 147/65   Pulse 59   Temp 97  F (36.1  C) (Axillary)   Resp 17   Wt 67.1 kg (148 lb)   SpO2 100%   Pertinent physical exam findings:  General: Encephalopathic but follows commands easily GCS was 14-15  Cardiac: Regular rate and rhythm S1-S2 without murmur rub  Pulmonary: Lungs are clear to ascultation bilaterally with good breath sounds  Chest wall: Atraumatic  Abdomen: Soft, mild periumbilical tenderness.  No guarding or peritoneal signs.  No abdominal wall ecchymosis.  Neuro: Cranial nerves 2 through 12 are intact.  5 out of 5 motor strength upper and lower extremities.Diagnostic studies:  Imaging:  XR Chest Port 1 View   Final Result   IMPRESSION: Endotracheal tube  tip 4 cm from the pedro luis. Slight improved aeration at the left base compared to previous, otherwise no change.      CTA Chest Abdomen Pelvis w Contrast   Final Result   IMPRESSION:   1.  No acute traumatic visceral, vascular, or acute osseous abnormality.   2.  Emphysema.   3.  Moderately enlarged heart status post median sternotomy.   4.  Diverticulosis. No diverticulitis, colitis, or obstruction.   5.  Right hepatic cyst, no further characterization or follow-up required.         Cervical spine CT w/o contrast   Final Result   IMPRESSION:   1.  No evidence for acute fracture or posttraumatic subluxation of the cervical spine by CT imaging.   2.  No high-grade spinal canal or neural foraminal stenosis.   3.  Mild pulmonary emphysema.      Head CT w/o contrast   Final Result   Addendum 1 of 1   ADDENDUM: There is also an acute right posterior temporo-occipital    convexity subdural hematoma measuring 4 mm.      Final   IMPRESSION:   1.  Acute left holohemispheric subdural hemorrhage measuring up to 24 mm in diameter with areas of internal hyperattenuation suggesting continued active hemorrhage. Associated mass effect produces subtotal effacement left lateral and third ventricles,    early left temporal uncal herniation, and 7 mm rightward midline shift.   2.  Acute bilateral tentorial subdural hematomas within the left posterior parafalcine extension.   3.  Small acute subarachnoid hemorrhage over the right lateral temporal sulci.   4.  No acute calvarial fracture or scalp hematoma.         Results discussed with Obi Celis on 5/15/2022 8:19PM.      XR Chest Port 1 View   Final Result   IMPRESSION: Question left lower lobe infiltrate and some pleural fluid. No pneumothorax. No gross displaced rib fracture.         Lab:  Labs Ordered and Resulted from Time of ED Arrival to Time of ED Departure   COMPREHENSIVE METABOLIC PANEL - Abnormal       Result Value    Sodium 135 (*)     Potassium 3.0 (*)     Chloride 102       Carbon Dioxide (CO2) 21 (*)     Anion Gap 12      Urea Nitrogen 11      Creatinine 0.81      Calcium 8.8      Glucose 120      Alkaline Phosphatase 97      AST 22      ALT 11      Protein Total 6.6      Albumin 3.6      Bilirubin Total 1.1 (*)     GFR Estimate 70     INR - Abnormal    INR 3.86 (*)    LACTIC ACID WHOLE BLOOD - Abnormal    Lactic Acid 2.3 (*)    ROUTINE UA WITH MICROSCOPIC REFLEX TO CULTURE - Abnormal    Color Urine Light Yellow      Appearance Urine Clear      Glucose Urine Negative      Bilirubin Urine Negative      Ketones Urine Negative      Specific Gravity Urine 1.049 (*)     Blood Urine Negative      pH Urine 6.0      Protein Albumin Urine Negative      Urobilinogen Urine <2.0      Nitrite Urine Negative      Leukocyte Esterase Urine Negative      RBC Urine <1      WBC Urine <1      Squamous Epithelials Urine <1     CBC WITH PLATELETS AND DIFFERENTIAL - Abnormal    WBC Count 9.6      RBC Count 2.73 (*)     Hemoglobin 8.1 (*)     Hematocrit 25.1 (*)     MCV 92      MCH 29.7      MCHC 32.3      RDW 15.9 (*)     Platelet Count 348      % Neutrophils 73      % Lymphocytes 14      % Monocytes 11      % Eosinophils 1      % Basophils 0      % Immature Granulocytes 1      NRBCs per 100 WBC 0      Absolute Neutrophils 7.2      Absolute Lymphocytes 1.3      Absolute Monocytes 1.0      Absolute Eosinophils 0.1      Absolute Basophils 0.0      Absolute Immature Granulocytes 0.1      Absolute NRBCs 0.0     INR - Abnormal    INR 1.50 (*)    TROPONIN I - Normal    Troponin I 0.02     ISTAT CREATININE POCT - Normal    Creatinine POCT 0.8      GFR, ESTIMATED POCT >60     COVID-19 VIRUS (CORONAVIRUS) BY PCR - Normal    SARS CoV2 PCR Negative     TYPE AND SCREEN, ADULT    ABO/RH(D) A POS      Antibody Screen Negative      SPECIMEN EXPIRATION DATE [id number]     ABO/RH TYPE AND SCREEN      Interventions: RSI intubation for airway control with etomidate and succinylcholine.  Patient was given sedation with  fentanyl and propofol.  Patient received  prothrombin concentrate and vitamin K for reversal of warfarin induced coagulopathy  Medical decision making: Discussion with the patient's  and daughter they indicated the patient  may have a POLST that indicates DNR/DNI.  I discussed the current situation and and if the patient  have chance of recovery back to her prior baseline they would agree to short-term intubation.  The patient had increased obtundation on return from CT was intubated for airway protection.  Patient's blood pressures have been mostly 110 and 130.  Latest blood pressure was after placement of NG tube.  EMS is now here to transport the patient to the ICU at [facility].  In discussion with Dr. Rocha the intensivist Dr. Vargas for neurosurgery the plan will be to go to the ICU with decision for operative intervention.  EMS will monitor blood pressure on route and maintain systolic blood pressure less than 140.    ED INTERVENTIONS     Medications   HOLD: warfarin (COUMADIN) therapy (has no administration in time range)   etomidate (AMIDATE) injection 20 mg (has no administration in time range)   succinylcholine (ANECTINE) injection 200 mg (has no administration in time range)   fentaNYL (PF) (SUBLIMAZE) injection 50 mcg (has no administration in time range)   propofol (DIPRIVAN) infusion (has no administration in time range)   ondansetron (ZOFRAN) injection 4 mg (4 mg Intravenous Given 5/15/22 1940)   iopamidol (ISOVUE-370) solution 100 mL (100 mLs Intravenous Given 5/15/22 2019)   phytonadione 10 mg in sodium chloride 0.9 % 50 mL intermittent infusion (0 mg Intravenous Stopped 5/15/22 2122)   prothrombin 4 factor complex concentrate (KCENTRA) infusion 1,656 Units (1,656 Units Intravenous Given 5/15/22 2101)   levETIRAcetam (KEPPRA) 1,342 mg in sodium chloride 0.9 % 100 mL intermittent infusion (0 mg Intravenous Stopped 5/15/22 2134)   propofol (DIPRIVAN) injection 10 mg/mL vial  (50 mg Intravenous Given 5/15/22 2124)   fentaNYL (PF) (SUBLIMAZE) injection 100 mcg (100 mcg Intravenous Given 5/15/22 2126)       DISCHARGE MEDICATIONS        Review of your medicines      UNREVIEWED medicines. Ask your doctor about these medicines      Dose / Directions   acetaminophen-codeine 300-30 MG per tablet  Commonly known as: TYLENOL w/CODEINE #3  Used for: Chronic right shoulder pain, Arthritis      Dose: 1-2 tablet  [ACETAMINOPHEN-CODEINE (TYLENOL #3) 300-30 MG PER TABLET] Take 1-2 tablets by mouth every 6 (six) hours as needed for pain.  Quantity: 15 tablet  Refills: 0              INFORMATION SOURCE AND LIMITATIONS    History/Exam limitations: She had altered mental status over baseline dementia  Patient information was obtained from: Patient's daughter and   Use of : N/A    HISTORY OF PRESENT ILLNESS   Zenaida Brown is a 86 year old year old female with a relevant past history of chronic atrial fibrillation, congestive heart failure, cardiac pacemaker, hypertension and coronary artery disease, who presents to this ED by private car for evaluation of a fall.    Per Chart Review,  2/2/2022 The patient presented to [facility] ED for evaluation of a fall. Final Impression: 1. Chronic anticoagulation 2. Fall, initial encounter 3. Contusion of forehead, initial encounter 4. Injury of head, initial encounter.    Per the patient's daughter, the patient was in the shower when she had a fall. The patient's  then helped her to their room where she fell again. The patient's  called the  to help pick her up. Per the , initially the patient appeared fine and shortly after the patient developed chest pain and nausea. In the ED the patient is heaving and vomiting. Patient is on warfarin.    REVIEW OF SYSTEMS:   Limited from patient's secondary to encephalopathy and underlying dementia.  Review of systems was obtained from  and daughter  Constitutional: Negative  for fever.   HENT: Negative for URI symptoms   Cardiac: Positive for chest pain. Negative for  or syncope  Respiratory: Negative for cough and shortness of breath.    Gastrointestinal: Positive for vomiting and nausea.  Patient complained of some possible abdominal pain   Neurological: Negative for dizziness, headache, syncope, speech difficulty, unilateral weakness or imbalance with walking.   Hematological: Patient is on warfarin.  Psychiatric/Behavioral: Positive for increased confusion over baseline dementia.       PATIENT HISTORY   No past medical history on file.  There is no problem list on file for this patient.    No past surgical history on file.  Social Histrory  Smoking:None  Alcohol Use:None  No Known Allergies      OUTPATIENT MEDICATIONS     New Prescriptions    No medications on file      Vitals:    05/15/22 2210 05/15/22 2215 05/15/22 2220 05/15/22 2225   BP: 130/53 115/56 (!) 147/65    Pulse: 59 59 67 59   Resp: 23 19 26 17   Temp:       TempSrc:       SpO2: 100% 100% 100% 100%   Weight:           Physical Exam   Constitutional: Patient on initial exam mildly encephalopathic.  She would answer to her name and follow commands easily.  Patient on initial exam had active retching   HEENT:    Head: Atraumatic.   Neck:  Neck supple.   Cardiovascular: Normal rate, regular rhythm and normal heart sounds.    Pulmonary/Chest: Normal effort  and breath sounds normal.   Abdominal: Soft.  No focal periumbilical tenderness.  No abdominal wall bruising  Musculoskeletal: Normal range of motion.   Neurological: Alert and oriented to person, Normal strength No cranial nerve deficit .   Skin: No ecchymotic areas on the chest wall, abdominal wall back or flank      DIAGNOSTICS    LABORATORY FINDINGS (REVIEWED AND INTERPRETED):  Labs Ordered and Resulted from Time of ED Arrival to Time of ED Departure   COMPREHENSIVE METABOLIC PANEL - Abnormal       Result Value    Sodium 135 (*)     Potassium 3.0 (*)     Chloride 102       Carbon Dioxide (CO2) 21 (*)     Anion Gap 12      Urea Nitrogen 11      Creatinine 0.81      Calcium 8.8      Glucose 120      Alkaline Phosphatase 97      AST 22      ALT 11      Protein Total 6.6      Albumin 3.6      Bilirubin Total 1.1 (*)     GFR Estimate 70     INR - Abnormal    INR 3.86 (*)    LACTIC ACID WHOLE BLOOD - Abnormal    Lactic Acid 2.3 (*)    ROUTINE UA WITH MICROSCOPIC REFLEX TO CULTURE - Abnormal    Color Urine Light Yellow      Appearance Urine Clear      Glucose Urine Negative      Bilirubin Urine Negative      Ketones Urine Negative      Specific Gravity Urine 1.049 (*)     Blood Urine Negative      pH Urine 6.0      Protein Albumin Urine Negative      Urobilinogen Urine <2.0      Nitrite Urine Negative      Leukocyte Esterase Urine Negative      RBC Urine <1      WBC Urine <1      Squamous Epithelials Urine <1     CBC WITH PLATELETS AND DIFFERENTIAL - Abnormal    WBC Count 9.6      RBC Count 2.73 (*)     Hemoglobin 8.1 (*)     Hematocrit 25.1 (*)     MCV 92      MCH 29.7      MCHC 32.3      RDW 15.9 (*)     Platelet Count 348      % Neutrophils 73      % Lymphocytes 14      % Monocytes 11      % Eosinophils 1      % Basophils 0      % Immature Granulocytes 1      NRBCs per 100 WBC 0      Absolute Neutrophils 7.2      Absolute Lymphocytes 1.3      Absolute Monocytes 1.0      Absolute Eosinophils 0.1      Absolute Basophils 0.0      Absolute Immature Granulocytes 0.1      Absolute NRBCs 0.0     INR - Abnormal    INR 1.50 (*)    TROPONIN I - Normal    Troponin I 0.02     ISTAT CREATININE POCT - Normal    Creatinine POCT 0.8      GFR, ESTIMATED POCT >60     COVID-19 VIRUS (CORONAVIRUS) BY PCR - Normal    SARS CoV2 PCR Negative     TYPE AND SCREEN, ADULT    ABO/RH(D) A POS      Antibody Screen Negative      SPECIMEN EXPIRATION DATE [id number]     ABO/RH TYPE AND SCREEN         IMAGING (REVIEWED AND INTERPRETED):  XR Chest Port 1 View   Final Result   IMPRESSION: Endotracheal tube tip 4 cm  from the pedro luis. Slight improved aeration at the left base compared to previous, otherwise no change.      CTA Chest Abdomen Pelvis w Contrast   Final Result   IMPRESSION:   1.  No acute traumatic visceral, vascular, or acute osseous abnormality.   2.  Emphysema.   3.  Moderately enlarged heart status post median sternotomy.   4.  Diverticulosis. No diverticulitis, colitis, or obstruction.   5.  Right hepatic cyst, no further characterization or follow-up required.         Cervical spine CT w/o contrast   Final Result   IMPRESSION:   1.  No evidence for acute fracture or posttraumatic subluxation of the cervical spine by CT imaging.   2.  No high-grade spinal canal or neural foraminal stenosis.   3.  Mild pulmonary emphysema.      Head CT w/o contrast   Final Result   Addendum 1 of 1   ADDENDUM: There is also an acute right posterior temporo-occipital    convexity subdural hematoma measuring 4 mm.      Final   IMPRESSION:   1.  Acute left holohemispheric subdural hemorrhage measuring up to 24 mm in diameter with areas of internal hyperattenuation suggesting continued active hemorrhage. Associated mass effect produces subtotal effacement left lateral and third ventricles,    early left temporal uncal herniation, and 7 mm rightward midline shift.   2.  Acute bilateral tentorial subdural hematomas within the left posterior parafalcine extension.   3.  Small acute subarachnoid hemorrhage over the right lateral temporal sulci.   4.  No acute calvarial fracture or scalp hematoma.         Results discussed with Obi Celis on 5/15/2022 8:19PM.      XR Chest Port 1 View   Final Result   IMPRESSION: Question left lower lobe infiltrate and some pleural fluid. No pneumothorax. No gross displaced rib fracture.              ECG (REVIEWED AND INTERPRETED):   ECG:   Performed at: 15-May-2022 20:06:36  HR:  80 bpm  Rhythm: Junctional   Axis: 67  QRS duration: 88 ms  QTC: 546 ms  ST changes: No ST segment elevation or depression,  no T wave inversion,No Q wave  Interpretation: Junctional rhythm  Compared to most recent ECG [id number] 09:29:16, no significant change.    I have reviewed the patient's ECG, with comments made as listed above. Please see scanned image for full interpretation.         I, Bal Hunter, am serving as a scribe to document services personally performed by Obi Celis D.O., based on my observation and the provider s statements to me.    I, Obi eClis D.O., attest that Bla Hunter is acting in a scribe capacity, has observed my performance of the services and has documented them in accordance with my direction.    Obi Celis D.O.  EMERGENCY MEDICINE   05/15/22  [facility] EMERGENCY DEPARTMENT  [address] [place] [id number]  [phone]  Dept: [phone]      Obi Celis DO  05/15/22 2304

## 2022-05-16 NOTE — H&P
Neuroscience Intensive Care History & Physical    Reason for critical care admission:  Subdural hematoma  Admitting Team: Neuro critical care  Date of Service:  05/16/2022  Date of Admission:  5/15/2022  Hospital Day: 2    Assessment/Plan  Zenaida Brown is a 86 year old female with a past medical history of atrial fibrillation on warfarin, sick sinus syndrome status post dual-chamber pacemaker, MR s/p mitral valve replacement, CAD, HTN, CHERRY admitted on 5/15/2022 after being found to have a 24 mm subdural hematoma complicated by cerebral edema and 7 mm midline shift.    Patient was intubated and had INR reversed prior to transfer to [facility] for further management.  Family opted not to have surgery for patient to be  The patient was initially DNI on a previous POLST.  I discussed this with family and they felt that keeping the patient intubated and medically managing may only prolong suffering.  I discussed extubation and comfort care measures with the family overnight.  They elected to proceed with comfort care and expressed a strong desire for extubation as soon as they are present at the hospital this morning.    Neuro  #Left subdural hemorrhage  #Acute bilateral subdural hematoma  #Small right lateral temporal sulcus subarachnoid hemorrhage  #Cerebral edema, 7 mm rightward midline shift  -Considering palliative consultation  -No neurochecks  -No blood pressure monitoring    #Analgesics & sedation  -We will plan to order morphine as needed    #Cognitive impairment  MoCA 18 out of 30 on last check.    CV  #Atrial fibrillation   #Sick sinus syndrome status post dual-chamber pacemaker  #Coronary artery disease  #Hypertension  #MR s/p mitral valve replacement  -Holding PTA meds  -Pacemaker will need to be addressed given that the patient is comfort    Resp  #Hypoxic respiratory failure  -We will plan for extubation this morning as soon as family arrives    GI  #No acute issues  Plan to remove NG  tube    #Hyperbilirubinemia  #GERD    Renal/  #Hyponatremia  #Hypokalemia  #Elevated lactate 2.3    Endo  #No acute issues    Heme  #Normocytic anemia  #Warfarin induced coagulopathy s/p reversal    ID  #No acute issues  -Daily CBC  -Follow temperature curve    ICU Check List  Lines/tubes/drains: Nasogastric tube, peripheral IV, ETT  FEN: N.p.o.  PPX: DVT - SCDs; GI -pantoprazole.  Code: DNR  Dispo: ICU - NCC    Clinically Significant Risk Factors Present on Admission              # Coagulation Defect: INR = 1.50 (Ref range: 0.85 - 1.15) and/or PTT = N/A on admission, will monitor for bleeding   # Coma: based on GCS score of <8    # Compression of brain and # Cerebral edema     The patient was seen and discussed with the NCC attending, Dr. Rocha.    Nydia Barbour MD  Neurology PGY-2    History of Present Illness:  Zenaida Brown is a 86 year old female with a past medical history of atrial fibrillation on warfarin, sick sinus syndrome status post dual-chamber pacemaker, MR s/p mitral valve replacement, CAD, HTN, CHERRY admitted on 5/15/2022 after being found to have a left subdural hematoma.    The patient was in the shower this evening when she had a fall.  The patient's  was able to help her to her room where she fell again.  The patient was ultimately brought in after development of chest pain and emesis x1.  Patient was initially evaluated in outside facility was transferred to [facility] for further evaluation after being found to have a left-sided subdural hematoma with 7 mm of midline shift.  Patient was intubated at the outside facility.  INR there was greater than 3, but was reversed to 1.5.    On arrival the patient was on propofol 25.  Cough and corneals were intact, pupils were equal and reactive, and she was withdrawing to pain in the left hemibody.  However patient was not withdrawing to pain in the right hemibody.  Patient was also consideration for surgical management of large 24  mm left subdural hemorrhage.  After discussion with family, it appeared that surgery is not within the patient's goals of care.  We elected not to proceed with decompression craniotomy and has had proceed with medical management.  Patient was also made DNR at this time.  Continue discussion with family, they felt that the patient's goals of care were most consistent with being comfort given her likely poor functional outcome and the futility of medical management after having declined surgery.      No Known Allergies    PAST MEDICAL HISTORY: No past medical history on file.    PAST SURGICAL HISTORY: No past surgical history on file.    FAMILY HISTORY:   Unable to obtain due to: Intubation      SOCIAL HISTORY:   Social History     Tobacco Use     Smoking status: Former Smoker     Smokeless tobacco: Never Used   Substance Use Topics     Alcohol use: Not Currently       ROS: The 10 point Review of Systems is negative other than noted in the HPI or here.     Current Medications:    pantoprazole  40 mg Per Feeding Tube QAM AC    Or     pantoprazole (PROTONIX) IV  40 mg Intravenous QAM AC       PRN Medications:  glucose **OR** dextrose **OR** glucagon, propofol (DIPRIVAN) infusion **AND** propofol **AND** CK total **AND** Triglycerides **AND** - MEDICATION INSTRUCTIONS - **AND** Notify Physician    Infusions:    propofol (DIPRIVAN) infusion Stopped (05/16/22 0120)    And     - MEDICATION INSTRUCTIONS -         Physical Examination:  Vitals: /52   Pulse 59   Temp (!) 96.4  F (35.8  C) (Axillary)   Resp 16   Wt 67.6 kg (149 lb 0.5 oz)   SpO2 100%   General: Adult female patient, lying in bed  HEENT: Normocephalic, atraumatic, ET tube in place  Cardiac: RRR, s1/s2 auscultated without murmur, S3/S4  Chest: Intubated  Abdomen: Soft, non-distended, bowel sounds present  Extremities: Warm, no edema, distal pulses +2, well perfused  Skin: No rash or lesion  Psych: Calm and cooperative  Neuro: Exam done about 30 minutes  after propofol turned off.  Mental status: Mute.  Does not follow commands.  Does not arouse even to noxious stimuli  Cranial nerves: Pupils 2 mm, equal and reactive.  Cough intact.  Gag intact.  Corneals intact.  Motor: Does not withdraw on the right hemibody.  Withdraws minimally to noxious stimulus in the left hemibody.  Sensory: Only withdraws to pain in the left hemibody  Coordination: Unable to assess  Gait: Unable to assess    NIHSS  Interval     1a. Level of Consciousness 3-->Responds only with reflex motor or autonomic effects or totally unresponsive, flaccid, and areflexic   1b. LOC Questions 2-->Answers neither question correctly   1c. LOC Commands 2-->Performs neither task correctly   2.   Best Gaze 0-->Normal   3.   Visual 0-->No visual loss   4.   Facial Palsy 0-->Normal symmetrical movements   5a. Motor Arm, Left 2-->Some effort against gravity, limb cannot get to or maintain (if cued) 90 (or 45) degrees, drifts down to bed, but has some effort against gravity   5b. Motor Arm, Right 4-->No movement   6a. Motor Leg, Left 2-->Some effort against gravity, leg falls to bed by 5 secs, but has some effort against gravity   6b. Motor Leg, right 4-->No movement   7.   Limb Ataxia 0-->Absent   8.   Sensory 2-->Severe to total sensory loss, patient is not aware of being touched in the face, arm, and leg   9.   Best Language 3-->Mute, global aphasia, no usable speech or auditory comprehension   10. Dysarthria (UN) Intubated or other physical barrier   11. Extinction and Inattention  0-->No abnormality   Total 24 (05/16/22 0217)     Labs:  Recent Labs   Lab 05/15/22  1948 05/15/22  1938   NA  --  135*   POTASSIUM  --  3.0*   CHLORIDE  --  102   CO2  --  21*   BUN  --  11   CR 0.8 0.81   RICHARD  --  8.8   BILITOTAL  --  1.1*   ALKPHOS  --  97   ALT  --  11   AST  --  22       Recent Labs   Lab 05/15/22  1938   WBC 9.6   HGB 8.1*          No results for input(s): PH, PCO2, PO2, HCO3 in the last 168  hours.    All cultures:  No results for input(s): CULTURE in the last 168 hours.    Imaging:  All relevant imaging and laboratory values personally reviewed.

## 2022-05-16 NOTE — PLAN OF CARE
Major Shift Events:  Admitted from [facility] ED with potential plan for craniotomy. Neurosurgery discussed with family on phone, family stated pt would not want surgery and affirmed code status of no CPR. Care transferred to neurocritical care, neurocrit also spoke with family and family made decision to transition to comfort cares. NG removed, massey left in place for end of life. Plan to extubate once family is at bedside. Pt turned q2 hours, propofol drip turned off and pt appears comfortable in bed. Arouses easily to voice, not agitated, falls back asleep easily. PERRL on last assessment. Localizing/purposeful movements with L extremities, withdrawing in RUE, some movement in RLE but less than LLE.    Plan: Comfort cares. Extubate this AM when family ready and team approves.  For vital signs and complete assessments, please see documentation flowsheets.         Goal Outcome Evaluation:    Plan of Care Reviewed With: patient     Overall Patient Progress: improving    Outcome Evaluation: Comfort measures provided, pt appears to be resting comfortably.

## 2022-05-16 NOTE — ED NOTES
Report previously given to [place] on 4A and [facility]. Updated floor that transport has left with patient and to call son Davey and daughter Shasta with any updates.     Dr. Hager anesthesiologist in OR at [facility] was also given report on patient.

## 2022-05-16 NOTE — PLAN OF CARE
Admitted/transferred from: [facility]  Reason for admission/transfer: emergency craniotomy  2 RN skin assessment: completed by Jos HERNANDEZ  Result of skin assessment and interventions/actions: see flowsheets  Height, weight, drug calc weight: done  Patient belongings (see Flowsheet): shirt, socks  MDRO education added to care plan: n/a  ?

## 2022-05-16 NOTE — PROGRESS NOTES
"    [facility]    ICU Transfer Accept Note - Medicine Service, MARLAKISHA TEAM 4       Date of Admission:  5/15/2022    Assessment & Plan          Zenaida Brown is a 86 year old female with a past medical history of atrial fibrillation on warfarin, sick sinus syndrome status post dual-chamber pacemaker, MR s/p mitral valve replacement, CAD, HTN, CHERRY admitted on 5/15/2022 after being found to have a 24 mm subdural hematoma complicated by cerebral edema and 7 mm midline shift.The patient demonstrated \"increased obtundation\" at [facility], prompting intubation prior to transfer. After discussion with patient's family, Zenaida was extubated to compassionate comfort cares. She will transfer out of the ICU to the med/surg floor today.     #Comfort Cares  -Comfort care order set placed  -Palliative consult for hospice placed by ICU team  -Continuous rate morphine for comfort at 2 mg/hr + prn  -Lorazepam prn for anxiety  -Glycopyrrolate for secretions       Diet: NPO for Medical/Clinical Reasons Except for: No Exceptions    DVT Prophylaxis: VTE Prophylaxis contraindicated due to comfort cares  Ventura Catheter: PRESENT, indication: End of Life  Fluids: TKO  Central Lines: None  Cardiac Monitoring: None  Code Status: No CPR- Do NOT Intubate      Disposition Plan   Expected Discharge: Pending hospice placement       The patient's care was discussed with the Attending Physician, Dr. Mitchell .    Pilo Ramachandran MD  Medicine Service, [facility] TEAM 4  [facility]  Securely message with the Vocera Web Console (learn more here)  Text page via Ezetap Paging/Directory   Please see signed in provider for up to date coverage information      Clinically Significant Risk Factors Present on Admission               # Coagulation Defect: INR = 1.50 (Ref range: 0.85 - 1.15) and/or PTT = N/A on admission, will monitor for bleeding    "     ______________________________________________________________________    Interval History   Nursing notes reviewed. Received signout from Neuro ICU team. Patient unable to participate in ROS.     Data reviewed today: I reviewed all medications, new labs and imaging results over the last 24 hours.     Physical Exam   Vital Signs: Temp: (!) 96.4  F (35.8  C) Temp src: Axillary BP: 110/51 Pulse: 63   Resp: 21 SpO2: 99 % O2 Device: None (Room air)    Weight: 149 lbs .5 oz  Gen: Sleeping, no acute distress, does not appear in pain  HEENT: Pinpoint pupils, no scleral icterus, edentulous  Card: NRRR, no murmurs noted  Pulm: No wheezing, prominent stertor on exam  Abd: Soft, nondistended  Ext: Does not localize pain, does not respond to voice or commands    Data   Recent Results (from the past 24 hour(s))   XR Chest Port 1 View    Narrative    EXAM: XR CHEST PORT 1 VIEW  LOCATION: [facility]  DATE/TIME: 5/15/2022 7:33 PM    INDICATION: Trauma.  COMPARISON: None.      Impression    IMPRESSION: Question left lower lobe infiltrate and some pleural fluid. No pneumothorax. No gross displaced rib fracture.   Head CT w/o contrast    Addendum: 5/15/2022    ADDENDUM: There is also an acute right posterior temporo-occipital convexity subdural hematoma measuring 4 mm.      Narrative    EXAM: CT HEAD W/O CONTRAST  LOCATION: [facility]  DATE/TIME: 5/15/2022 7:52 PM    INDICATION: Multiple falls, nausea.  COMPARISON: None.  TECHNIQUE: Routine CT Head without IV contrast. Multiplanar reformats. Dose reduction techniques were used.    FINDINGS:  INTRACRANIAL CONTENTS: There is a mixed attenuation left holohemispheric extra-axial collection measuring up to 24 mm in diameter overlying the left frontotemporal convexity. There also acute bilateral tentorial subdural hematomas, measuring up to 7 mm   on the left and 2 mm on the right, with thin left posterior parafalcine extension. Small  amount of acute subarachnoid hemorrhage over the right lateral temporal sulci. Associated mass effect produces subtotal effacement of the left lateral and third   ventricles and 7 mm rightward midline shift. There is also early left temporal uncal herniation (coronal image #19). No CT evidence of acute infarct. Mild to moderate presumed chronic small vessel ischemic changes. Mild to moderate generalized volume   loss. No hydrocephalus.     VISUALIZED ORBITS/SINUSES/MASTOIDS: Prior bilateral cataract surgery. Visualized portions of the orbits are otherwise unremarkable. No paranasal sinus mucosal disease. No middle ear or mastoid effusion.    BONES/SOFT TISSUES: No acute calvarial fracture or scalp hematoma. Benign hyperostosis frontalis interna.        Impression    IMPRESSION:  1.  Acute left holohemispheric subdural hemorrhage measuring up to 24 mm in diameter with areas of internal hyperattenuation suggesting continued active hemorrhage. Associated mass effect produces subtotal effacement left lateral and third ventricles,   early left temporal uncal herniation, and 7 mm rightward midline shift.  2.  Acute bilateral tentorial subdural hematomas within the left posterior parafalcine extension.  3.  Small acute subarachnoid hemorrhage over the right lateral temporal sulci.  4.  No acute calvarial fracture or scalp hematoma.      Results discussed with Obi Celis on 5/15/2022 8:19PM.   Cervical spine CT w/o contrast    Narrative    EXAM: CT CERVICAL SPINE W/O CONTRAST  LOCATION: [facility]  DATE/TIME: 5/15/2022 7:52 PM    INDICATION: Multiple falls, acute intracranial hemorrhage  COMPARISON: None.  TECHNIQUE: Routine CT Cervical Spine without IV contrast. Multiplanar reformats. Dose reduction techniques were used.    FINDINGS:  VERTEBRA: Normal vertebral body heights. Mild broad cervical levocurvature, possibly positional. Slight anterolisthesis of C4 on C5. No fracture or posttraumatic  subluxation.     CANAL/FORAMINA: No high-grade spinal canal or neural foraminal stenosis.    PARASPINAL: Atherosclerotic calcifications at the left carotid bifurcation. Fatty atrophy of the posterior paraspinous musculature. Mild biapical pulmonary emphysema.        Impression    IMPRESSION:  1.  No evidence for acute fracture or posttraumatic subluxation of the cervical spine by CT imaging.  2.  No high-grade spinal canal or neural foraminal stenosis.  3.  Mild pulmonary emphysema.   CTA Chest Abdomen Pelvis w Contrast    Narrative    EXAM: CTA CHEST ABDOMEN PELVIS W CONTRAST  LOCATION: [facility]  DATE/TIME: 5/15/2022 7:53 PM    INDICATION: Trauma, chest pain, fall.  COMPARISON: CT abdomen and pelvis performed 11/26/2016.  TECHNIQUE: CT angiogram chest abdomen pelvis during arterial phase of injection of IV contrast. 2D and 3D MIP reconstructions were performed by the CT technologist. Dose reduction techniques were used.   CONTRAST: Isovue 370 100 ml.    FINDINGS:   CT ANGIOGRAM CHEST, ABDOMEN, AND PELVIS: Normal caliber tortuous atherosclerotic aorta. No dissection. No central pulmonary embolus. Mildly enlarged main pulmonary artery, may reflect pulmonary hypertension.    LUNGS AND PLEURA: Emphysema. No pneumothorax or pleural effusion. No focal consolidation.    MEDIASTINUM/AXILLAE: Moderately enlarged heart. No pericardial effusion. No hilar or mediastinal lymphadenopathy. Status post median sternotomy. Left subclavian approach pacing device.    CORONARY ARTERY CALCIFICATION: None.    HEPATOBILIARY: Simple appearing right hepatic cyst, no follow-up required. Cholelithiasis.    PANCREAS: Normal.    SPLEEN: Normal.    ADRENAL GLANDS: Normal.    KIDNEYS/BLADDER: No obstructing renal or ureteral stones. No hydroureteronephrosis.    BOWEL: Diverticulosis. No diverticulitis, colitis, obstruction, or appendicitis. No free air or fluid.    LYMPH NODES: Normal.    PELVIC ORGANS:  Normal.    MUSCULOSKELETAL: Multiple median sternotomy wires. Mild to moderate multilevel discogenic degenerative change. No widening of the sacroiliac joints or pubic symphysis.      Impression    IMPRESSION:  1.  No acute traumatic visceral, vascular, or acute osseous abnormality.  2.  Emphysema.  3.  Moderately enlarged heart status post median sternotomy.  4.  Diverticulosis. No diverticulitis, colitis, or obstruction.  5.  Right hepatic cyst, no further characterization or follow-up required.     XR Chest Port 1 View    Narrative    EXAM: XR CHEST PORT 1 VIEW  LOCATION: [facility]  DATE/TIME: 5/15/2022 9:03 PM    INDICATION: Post intubation.  COMPARISON: 05/15/2022 at 7:30 PM.      Impression    IMPRESSION: Endotracheal tube tip 4 cm from the pedro luis. Slight improved aeration at the left base compared to previous, otherwise no change.   XR Abdomen Port 1 View    Narrative    EXAMINATION:  XR ABDOMEN PORT 1 VIEWS 5/16/2022 1:10 AM.    COMPARISON: None.    HISTORY:  Check NG/OG tube placement    FINDINGS: AP supine view. Gastric tube tip and sidehole project over  the stomach. No obstructive bowel gas pattern. Left basilar pulmonary  opacity. Partially visualized cardiac lead, prosthetic cardiac valve,  sternotomy wires. Possible left pleural effusion      Impression    IMPRESSION: Gastric tube tip and sidehole project over the stomach.    I have personally reviewed the examination and initial interpretation  and I agree with the findings.    ANDREY ANGELES MD         SYSTEM ID:  [id number]       Internal Medicine Staff Addendum  Date of Service: 5/16/2022    I have seen and examined this patient, reviewed the data and discussed the plan of care. I agree with the above documentation including plan and ddx unless otherwise stated:     # pt comfortable appearing in no distress. Discussed care and protocols with numerous family members and recommended one main contact. They were in agreement  and will communicate with the nurse.    Francesco Mitchell MD  Internal Medicine Hospitalist  [facility]  Attending pager: [phone]

## 2022-05-17 NOTE — PROGRESS NOTES
Transferred to: Unit 6A room 6204 at 0530  Belongings: sent to receiving unit  Ventura removed? No: comfort cares  Central line removed? NA  Chart and medications sent with patient Yes  Family notified: yes

## 2022-05-17 NOTE — CONSULTS
[facility] - Palliative Care Consultation Note    Physician Attestation   I, Messi Duron MD, saw this patient with Abran Beckham, MS4  and agree with the his findings and plan of care as documented in the note.       I personally reviewed vital signs, medications, labs, imaging and participated in the entire palliative interview.     Date of Service (when I saw the patient): 05/17/2022    Total time spent was 70 minutes,  >50% of time was spent counseling and/or coordination of care regarding setting for end of life care.    Messi Duron MD MS [facility] Palliative Care Service  Office [phone]  Fax [phone]      Patient: Zenaida Brown  Date of Admission:  5/15/2022    Requesting Clinician / Team: MD Filiberto Monreal 4  Reason for consult: Decisional support  Patient and family support    Assessment/Recommendations:  1)   GOALS OF CARE per patient's family    Comfort focused    Code Status:  DNR/DNI    2)    ADVANCED CARE PLANNING    Patient has completed health care directive:  no    Documented health care agent:  N/a    Surrogate decision maker if no appointed agent:  children    3)    SYMPTOM MANAGEMENT     We are not currently helping to manage symptoms in this patient    No unmet symptom burden identified    4)    PSYCHOSOCIAL/SPIRITUAL SUPPORT    Zenaida received the Sacrament of Healing and the family does not need psychosocial support at this time.    We did review the visitor policy for patients at the end of life.    These recommendations have been discussed with Dr. Ramachandran by Abran Beckham.      Thank you for the opportunity to participate in the care of this patient and family.      During regular M-F work hours -- if you are not sure who specifically to contact -- please contact us by calling [phone].        Palliative Care Assessment      Prognosis, Goals, and/or Advance Care Planning were addressed today:  yes    Mood, coping, and/or meaning in the  context of serious illness were addressed today: yes    Summary of Palliative Encounter:  I discussed the reason for Palliative Care Referral and our role in symptom management, patient family communication, understanding their choices for medical treatment, and providing  guidance in making difficult decisions in the framework of focusing on patient comfort and quality of life.  Reviewed current conditions and treatments including  86 year s/p fall at home in bathroom with 24 mm SDH and 7 mm midline shift who was compassionately extubated yesterday and placed on comfort cares.    Abran Beckham and Dr. Duron met with Zenaida's children Rufino moya in person and Ed by phone. We were later joined by Rufino's daughter Cabrera.  They provided a good medical summary and review of how they reached a decision to move to comfort care based on their mother's goals.  They provided a good social history.      We discussed where Zenaida should pass away and they are worried her death might be hastened by transferring her to another facility.  They do not feel they have the support to be able to care for her in the home she shares with her .      Zenaida does appear to be in the final short days of her life and I agree that we should allow her to pass away here in the hospital.  The family shares our assessment that she appears comfortable.    We provided some anticipatory guidance about the dying process including Cheyne-Anders breathing (which the son has noticed) and the potential to develop a death rattle.  The family expressed comfort in knowing these things.    Medical Situation: Zenaida Brown is a 86 year old female with a past medical history of atrial fibrillation on warfarin, sick sinus syndrome status post dual-chamber pacemaker, MR s/p mitral valve replacement, CAD, HTN, CHERRY admitted on 5/15/2022 after being found to have a 24 mm subdural hematoma complicated by cerebral edema and 7 mm midline shift.The patient  "demonstrated \"increased obtundation\" at [facility], prompting intubation prior to transfer. After discussion with patient's family, Zenaida was extubated to compassionate comfort cares. Transferred to general medicine on 5/16.     Previous Palliative Care Encounters: none on file.    Functional Status:     Functional Status two weeks prior to hospitalization: PPS (0 = death; 100 = normal):   60%- 1. Reduced; 2. Unable to do hobby/housework, significant disease; 3. Occasional assistance necessary; 4. Normal or reduced; 5. Full or confusion     Functional status Current:   PPS: (0 = death; 100 = normal):   10%- 1. Totally bed bound; 2. Unable to do any activity, extensive disease; 3. Total care; 4. Mouth care only; 5. Drowsy or coma +/- confusion     Prognosis, Goals, & Planning:   Prognosis (quality & quantity): short days to week   Basis for estimate:  Clinical judgment  High risk of death within one year? YES     Patient's decision making preferences: shared with support from loved ones    Capacity evaluation:    Zenaida  DOES NOT have capacity to make a decision regarding COMPLEX GOALS OF CARE  based on the following: SHE IS MORIBUND      I have concerns about the patient/family's health literacy today: NO - grand daughter is L&D nurse at [employer]           Patient has a completed Health Care Directive: No.       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     Hobbies: Loved puzzles and crosswords. Loved spending time out at restaurants or going for drives, especially around [employer] or [employer].      Patient is 'famous for...\"Laying down the law\" and her Christmases with beautiful trees, decorations, and " "festivities    Spirituality: Voodoo, strained relationship with Yazidi over last decade     Living situation: Living with  in an independent living condo in [facility]    Main family / caregivers: ; Henrry, Daughter; Shasta, and Sons; Rufino and Ed.      History of Present Illness:  History gathered today from: patient's family and Epic record    Zenaida Brown is a 86 year old female with a past medical history of atrial fibrillation on warfarin, sick sinus syndrome status post dual-chamber pacemaker, MR s/p mitral valve replacement, CAD, HTN, CHERRY admitted on 5/15/2022 after being found to have a 24 mm subdural hematoma complicated by cerebral edema and 7 mm midline shift after an unwitnessed fall at home in the bathroom.The patient demonstrated \"increased obtundation\" at [facility], prompting intubation prior to transfer. After discussion with patient's family, Zenaida was extubated to compassionate comfort cares. Transferred to general medicine on 5/16.     Key Palliative Symptom Data:  We are not helping to manage these symptoms currently in this patient.    Patient is on opioids: bowels not assessed today.    ROS:  Comprehensive ROS is reviewed and is negative except as here & per HPI:     Past Medical History:  No past medical history on file.     Past Surgical History:  No past surgical history on file.      Family History:  No family history on file.      Allergies:  No Known Allergies     Medications:  I have reviewed this patient's medication profile and medications from this hospitalization.   Notable for IV morphine drip    PRN MEDS:   glucose **OR** dextrose **OR** glucagon, glycopyrrolate, LORazepam, morphine     Morphine Equivalent Daily Dose: 48 mg IV Morphine/day    Physical Exam:   No data recorded    Pulse  Min: 124  Max: 124    Resp  Min: 24  Max: 24    No data recorded    SpO2  Min: 86 %  Max: 93 %        General Appearance: frail older woman with shallow breathing and a " Cheyne-Anders pattern with up to 12 second pauses.  Head: Normocephalic, without obvious abnormality, atraumatic.   Eyes: Conjunctivae/corneas clear.   Throat: Lips, mucosa, and tongue moist; teeth and gums normal.   Resp: shallow, unlabored  CV: RRR to radial pulses; no DP pulses palpable  Skin: Warm and dry, no rashes in exposed areas.   Neurologic: RASS -4  Psych:  Unable to assess     Intake/Output Summary (Last 24 hours) at 5/17/2022 1544  Last data filed at 5/17/2022 1459  Gross per 24 hour   Intake 31.13 ml   Output 325 ml   Net -293.87 ml          Data reviewed:  Reviewed recent pertinent imaging, comments:   05/15/2022 CT HEAD WITH CONTRAST  IMPRESSION:  1.  Acute left holohemispheric subdural hemorrhage measuring up to 24 mm in diameter with areas of internal hyperattenuation suggesting continued active hemorrhage. Associated mass effect produces subtotal effacement left lateral and third ventricles,   early left temporal uncal herniation, and 7 mm rightward midline shift.  2.  Acute bilateral tentorial subdural hematomas within the left posterior parafalcine extension.  3.  Small acute subarachnoid hemorrhage over the right lateral temporal sulci.  4.  No acute calvarial fracture or scalp hematoma.    Reviewed recent labs, comments:   Lab Results: personally reviewed    Data   Recent Labs   Lab 05/15/22  2332 05/15/22  1948 05/15/22  1938   WBC  --   --  9.6   HGB  --   --  8.1*   PLT  --   --  348   POTASSIUM  --   --  3.0*   CHLORIDE  --   --  102   CO2  --   --  21*   BUN  --   --  11   CR  --  0.8 0.81   RICHARD  --   --  8.8   *  --  120   ALBUMIN  --   --  3.6   PROTTOTAL  --   --  6.6   BILITOTAL  --   --  1.1*   ALKPHOS  --   --  97   ALT  --   --  11   AST  --   --  22     Lab Results   Component Value Date/Time    ALBUMIN 3.6 05/15/2022 07:38 PM       Weights:   Vitals:    05/15/22 2315   Weight: 67.6 kg (149 lb 0.5 oz)    There is no height or weight on file to calculate BMI.         Information gathered today from: family/loved ones, medical team members, unit team members and chart review in Epic.    TTS: I have personally spent a total of 65 minutes  today on the unit in review of medical record, consultation with the medical providers and assessment of patient today, with more than 50% of this time spent in counseling, coordination of care, and conversation in a family meeting with  Two children in person and 1 by phone re: diagnostic results, prognosis, symptom management, emotional support, risks and benefits of management options, and development of plan of care.     Messi Duron MD MS FAAFP  [facility]iative [facility]  Office [phone]  Fax [phone]    During regular M-F work hours -- if you are not sure who specifically to contact -- please contact us by calling [phone].

## 2022-05-17 NOTE — PLAN OF CARE
Arrived from: 4A  Belongings/meds: none with patient  2 RN Skin Assessment Completed by: Rosalia RN & Angelo RN  Non-intact findings documented (yes/no/NA): scabbing to R shoulder, bruising thru out    Pt admitted following fall in shower, CT should 24 mm SDH with 7mm midline shift and cerebral edema. Hx. A fib with warfarin use. On comfort cares. Pt obtunded, responds intermittently with repositioning. x1 IV for morphine gtt, 2mg/hr. Ventura in place for comfort. Low UOP. On 2.5 L for comfort, spot checks show 83-93% NC. Repo q2h. 4A RN notified family of floor change.

## 2022-05-17 NOTE — PLAN OF CARE
On comfort cares. First contact in family is daughter Zuleyka and she has been at her side all shift. , 2nd call , Henrry has been here this shift ,as well as son Rufino.There has been no change in her condition. Not responsive. O2 was placed on night shift for comfort.Has Morphine 2 mg IV/hr continuous On pump.Ventura in place, 10 ml out this shift.Palliative care saw family this afternoon. Comfort cares Q 2 hours.

## 2022-05-17 NOTE — PROGRESS NOTES
"    [facility]    ICU Transfer Accept Note - Medicine Service, AMBAR TEAM 4       Date of Admission:  5/15/2022    Assessment & Plan   Zenaida Brown is a 86 year old female with a past medical history of atrial fibrillation on warfarin, sick sinus syndrome status post dual-chamber pacemaker, MR s/p mitral valve replacement, CAD, HTN, CHERRY admitted on 5/15/2022 after being found to have a 24 mm subdural hematoma complicated by cerebral edema and 7 mm midline shift.The patient demonstrated \"increased obtundation\" at [facility], prompting intubation prior to transfer. After discussion with patient's family, Zenaida was extubated to compassionate comfort cares. Transferred to general medicine on 5/16.     #Comfort Cares  -Comfort care order set placed  -Palliative consult, appreciate recs  -Continuous rate morphine for comfort at 2 mg/hr + prn  -Lorazepam prn for anxiety  -Glycopyrrolate for secretions       Diet: NPO for Medical/Clinical Reasons Except for: No Exceptions    DVT Prophylaxis: VTE Prophylaxis contraindicated due to comfort cares  Ventura Catheter: PRESENT, indication: End of Life  Fluids: TKO  Central Lines: None  Cardiac Monitoring: None  Code Status: No CPR- Do NOT Intubate      Disposition Plan   Expected Discharge: Pending hospice placement       The patient's care was discussed with the Attending Physician, Dr. Lazo. .    Pilo Ramachandran MD  Medicine Service, MARLAKISHA TEAM 4  [facility]  Securely message with the Vocera Web Console (learn more here)  Text page via [employer] Paging/Directory   Please see signed in provider for up to date coverage information      Clinically Significant Risk Factors Present on Admission                  ______________________________________________________________________    Interval History   Nursing notes reviewed. Received signout from Neuro ICU team. Patient unable to " participate in ROS.     Data reviewed today: I reviewed all medications, new labs and imaging results over the last 24 hours.     Physical Exam   Vital Signs:      Pulse: (!) 124   Resp: 24 SpO2: 93 % O2 Device: Nasal cannula Oxygen Delivery: 2.5 LPM  Weight: 149 lbs .5 oz  Gen: Sleeping, no acute distress, does not appear in pain  Pulm: On 2L NC, no increased WOB noted  Abd: nondistended  Neuro: obtunded, unresponsive to voice    Data   No results found for this or any previous visit (from the past 24 hour(s)).

## 2022-05-17 NOTE — PLAN OF CARE
Major Shift Events:  Continues to be on comfort cares. Morphine 2.   Plan: continue cares to maintain pt comfort  For vital signs and complete assessments, please see documentation flowsheets.

## 2022-05-17 NOTE — PROGRESS NOTES
Care Management     Length of Stay (days): 2    Expected Discharge Date: 05/18/2022     Concerns to be Addressed:   Disposition planning    Patient plan of care discussed at interdisciplinary rounds: Yes    Anticipated Discharge Disposition:   Hospice     Anticipated Discharge Services:  Hospice  Anticipated Discharge DME:  Not applicable    Patient/family educated on Medicare website which has current facility and service quality ratings:  Not on this date  Education Provided on the Discharge Plan:  SW did not meet with family on this date  Patient/Family in Agreement with the Plan:  SW did not meet with family on this date    Referrals Placed by CM/SW:   None on this date  Private pay costs discussed: Not applicable at this time    Additional Information:  Pt is currently comfort cares only.  SW noted that on 5/15/2022, pt had orders for a Palliative Care Consult that was discontinued.  SW phoned Dr. Ramachandran ([place] [id number]) this am to inquire about the above and to ask whether or not Palliative Care will be re-consulted.  Dr. Ramachandran indicated that he would re-order palliative care consult.  SW inquired as to whether or not death is imminent.  Dr. Ramachandran indicated that he suspected 24-48 hour life expectancy but would like Palliative Care's input regarding this.  Per Dr. Ramachandran, SDH and cerebral edema are the hospice diagnosis.  Dr. Ramachandran also indicates that he would like Palliative Care input in regards to the Morphine drip as the drip would be a anticipated barrier to discharge.      SW will await Palliative input regarding prognosis.  If death is imminent it would likely not benefit pt/family to move pt.  However, if death is not thought to be imminent, SW will meet with pt's family to assist in arranging for hospice placement.      IFRAH Ruby  Social Work, 6A  Phone:  [phone]  Pager:  [phone]  5/17/2022          KAMLA Prater

## 2022-05-18 NOTE — PROGRESS NOTES
SPIRITUAL HEALTH SERVICES  SPIRITUAL ASSESSMENT Progress Note (Palliative Focus)  [facility] 6A    REFERRAL SOURCE: Staff request.    Visit with patient Zenaida Brown's  Henrry, daughter Janis, son Rufino, and daughter in law, all at Zenaida's bedside. Henrry had just returned from making  arrangements. He shared his love for Zenaida and told stories of their lives together. Children offered affirmation and stories of their own.  Family are mutually supportive and supportive of Zenaida.    Henrry also shared Zenaida's devotion to her Sikhism ester and to praying the rosary. She has a rosary with her in the hospital.     I offered listening presence, facilitated life review, and grief support.    Plan: I will follow for spiritual support while Palliative Care is consulted.    Hollie Stevens  Palliative   Pager [phone]  [facility] Inpatient Team Consult pager [phone] (M-F 8-4:30)  After-hours Answering Service [phone]

## 2022-05-18 NOTE — PROGRESS NOTES
Care Management Follow Up     Length of Stay (days): 3     Expected Discharge Date: 05/18/2022     Concerns to be Addressed:   Disposition planning    Patient plan of care discussed at interdisciplinary rounds: Yes     Anticipated Discharge Disposition:  To be determined     Anticipated Discharge Services:  To be determined  Anticipated Discharge DME:  Not applicable     Patient/family educated on Medicare website which has current facility and service quality ratings:  Not at this time  Education Provided on the Discharge Plan:   Not at this time  Patient/Family in Agreement with the Plan:  Not applicable     Referrals Placed by CM/SW:  None at this time  Private pay costs discussed: Not applicable     Additional Information:  Pt is comfort cares only.  Palliative consulted on 5/17/2022 .        SW spoke with Dr. Ramachandran ([facility] 4) who states that pt has begun cheynes-cobb breathing.  Dr. Ramachandran states that death is likely in the next 48 hours and as this is the case, moving forward with discharge planning is not appropriate.    SW available should needs arise.    IFRAH Ruby  Social Work, 6A  Phone:  [phone]  Pager:  [phone]  5/18/2022

## 2022-05-18 NOTE — PROGRESS NOTES
[facility], [place]   05/18/2022  Neurosurgery Progress Note:    Assessment:  Zenaida Brown is a 86 year old female with Acute 2.4 cm left convexity subdural hematoma; transitioned to comfort cares    Clinically Significant Risk Factors Present on Admission                    Plan:  - Confirmed with family--would not want any surgical intervention.  Family has opted for comfort cares at this time.   Neurosurgery will follow peripherally at this time.  Please contact us if anything is to change/if family would want surgical intervetion.    -----------------------------------  Jaleesa Edmondson PA-C  5/18/2022 3:02 PM   Neurosurgery  [phone]   -----------------------------------    Discussed with Dr. Mendez neurosurgery attending    Interval History: Family at bedside.  Have transitioned to comfort cares.  Would not want surgical intervention for her.  Dementia at baseline.  Want her to pass comfortably.    Objective:      I/O last 3 completed shifts:  In: -   Out: 75 [Urine:75]    Gen: Appears comfortable, NAD  Did not awaken to examine    LABS:  Recent Labs   Lab 05/15/22  2332 05/15/22  1948 05/15/22  1938   NA  --   --  135*   POTASSIUM  --   --  3.0*   CHLORIDE  --   --  102   CO2  --   --  21*   ANIONGAP  --   --  12   *  --  120   BUN  --   --  11   CR  --  0.8 0.81   RICHARD  --   --  8.8       Recent Labs   Lab 05/15/22  1938   WBC 9.6   RBC 2.73*   HGB 8.1*   HCT 25.1*   MCV 92   MCH 29.7   MCHC 32.3   RDW 15.9*          IMAGING:  No results found for this or any previous visit (from the past 24 hour(s)).

## 2022-05-18 NOTE — PROGRESS NOTES
[facility] PALLIATIVE CARE PROGRESS NOTE      Physician Attestation   I, Messi Duron MD, saw this patient with Abran Beckham MS4 and agree with the his findings and plan of care as documented in the note.       I personally reviewed vital signs, medications, labs, imaging and participated in the palliative interview.     Date of Service (when I saw the patient): 5/18/2022    Total time spent was 35 minutes,  >50% of time was spent counseling and/or coordination of care regarding end of life care.    Messi Duron MD MS [facility] Palliative Care Service  Office [phone]  Fax [phone]        Chief Complaint:  Closed Head Injury; SDH with midline shift and herniation    Assessment/Recommendations:  1)   GOALS OF CARE per patient's family  ? Comfort focused  ? Code Status:  DNR/DNI     2)    ADVANCED CARE PLANNING  ? Patient has completed health care directive:  no  ? Documented health care agent:  N/a  ? Surrogate decision maker if no appointed agent:  children     3)    SYMPTOM MANAGEMENT   ? We are not currently helping to manage symptoms in this patient  ? No unmet symptom burden identified  ? Did not appear dyspneic or to need supplemental oxygen this morning and can be discontinued for now. If air hunger appears to develop, can be place back on supplemental oxygen or per family request.     4)    PSYCHOSOCIAL/SPIRITUAL SUPPORT  ? Zenaida received the Sacrament of Healing and the family does not need psychosocial support at this time.  ? We did review the visitor policy for patients at the end of life.     These recommendations have been discussed with primary team in person.      Thank you for the opportunity to participate in the care of this patient and family.      During regular M-F work hours -- if you are not sure who specifically to contact -- please contact us by calling [phone].      Key Palliative Symptom Data:  # Pain severity the last 12 hours: none  #  Dyspnea severity the last 12 hours: none  # Nausea severity the last 12 hours: none  We are not managing anxiety in this patient    Patient is on opioids: bowels not assessed today.    Oral Morphine Equivalent Dose:  240 OME/day    Prognosis, Goals, or Advance Care Planning was addressed today with: Yes.    Mood, coping, and/or meaning in the context of serious illness were addressed today: Yes.    Palliative Encounter Summary/Comments:      Zenaida was met in room this morning with daughter; Shatsa, Son; Kishor, and daughter in-law. She appeared comfortable and did not show signs of pain or distress. Her pauses in respirations appeared slightly longer than yesterday, though she did not show signs of dyspnea. She has been on 2Lpm via NC and oxygen saturation last night was 99%. She has been breathing through her mouth and it is unlikely that she is getting a significant amount of oxygen from the nasal cannula. Family reports no concerns overnight. They did ask about whether or not there were special considerations with her pacemaker, to which they were counseled that this would not affect her course as there is not a defibrillator component (verified on chart review).     We also stopped and checked Zenaida a second time around 1500.  She remains comfortable and the family has no further questions. My prognosis is that she is in the final 1-2 days of her life.    Physical Exam:  Zenaida appears comfortable and without pain lying in bed. Unresponsive to external stimuli.  Occasional 10 second pauses in respirations with Cheynes-Anders pattern, but not cyanotic or showing signs of air hunger.   Distal extremities warm. Palpable radial pulse. No pedal pulses palpated. Slight mottling of feet bilaterally. Mouth open and she extends her jaw with inspiration.  Minimal urine output.    TTS: I have personally spent a total of 35 minutes  today on the unit in review of medical record, consultation with the medical providers and  assessment of patient today, with more than 50% of this time spent in counseling about the dying process, coordination of care, and conversation with adult children re:   prognosis, symptom management, risks and benefits of management options, emotional support and development of plan of care.    Vinnie Beckham MS4  [facility]    Messi Duron MD MS FAAFP  [facility] Palliative Care Service  Office [phone]  Fax [phone]

## 2022-05-18 NOTE — PLAN OF CARE
Pt admitted following fall in shower, CT should 24 mm SDH with 7mm midline shift and cerebral edema. Hx. A fib with warfarin use. On comfort cares. Pt obtunded, intermittent moaning/flinching with cares. x1 IV for morphine gtt, 2mg/hr. Ventura in place for comfort. Low UOP. On 3 L NC for comfort, Repo q2h.

## 2022-05-18 NOTE — PROGRESS NOTES
Admitted 5/15 with SDH, transitioned to CC 5/16. On 3 L NC for comfort. Ventura in place, no BM, unresponsive, daughter Zuleyka and  Henrry here this afternoon, son Rufino and wife here this evening. On continuous morphine 1 mg/ml. No s/s of pain.

## 2022-05-18 NOTE — PROGRESS NOTES
"    [facility]    ICU Transfer Accept Note - Medicine Service, MARLAKISHA TEAM 4       Date of Admission:  5/15/2022    Assessment & Plan   Zenaida Brown is a 86 year old female with a past medical history of atrial fibrillation on warfarin, sick sinus syndrome status post dual-chamber pacemaker, MR s/p mitral valve replacement, CAD, HTN, CHERRY admitted on 5/15/2022 after being found to have a 24 mm subdural hematoma complicated by cerebral edema and 7 mm midline shift.The patient demonstrated \"increased obtundation\" at [facility], prompting intubation prior to transfer. After discussion with patient's family, Zenaida was extubated to compassionate comfort cares. Transferred to general medicine on 5/16. Palliative consulted.     #Comfort Cares  Patient appears to be starting to cheynes-cobb breathe, indicating progression towards passing away.   -Comfort care order set placed  -Palliative consult, appreciate recs  -Continuous rate morphine for comfort at 2 mg/hr + prn  -Lorazepam prn for anxiety  -Glycopyrrolate for secretions       Diet: NPO for Medical/Clinical Reasons Except for: No Exceptions    DVT Prophylaxis: VTE Prophylaxis contraindicated due to comfort cares  Ventura Catheter: PRESENT, indication: End of Life  Fluids: TKO  Central Lines: None  Cardiac Monitoring: None  Code Status: No CPR- Do NOT Intubate      Disposition Plan   Expected Discharge: Plan to pass away in hospital given time-limited prognosis     The patient's care was discussed with the Attending Physician, Dr. Lazo. .    Pilo Ramachandran MD  Medicine Service, MARLAKISHA TEAM 4  [facility]  Securely message with the Vocera Web Console (learn more here)  Text page via [employer] Paging/Directory   Please see signed in provider for up to date coverage information      Clinically Significant Risk Factors Present on Admission              "     ______________________________________________________________________    Interval History   Nursing notes reviewed. NAEO. Appears comfortable to family. Having some moments of Cheynes-cobb breathing. ROS limited by patient inability to participate.     Data reviewed today: I reviewed all medications, new labs and imaging results over the last 24 hours.     Physical Exam   Vital Signs:                  Weight: 149 lbs .5 oz  Gen: Sleeping, does not appear in pain  Pulm: Not on NC, intermittent pauses in breathing  HEENT: Mouth open, edentulous  Neuro: obtunded, unresponsive to voice    Data   No results found for this or any previous visit (from the past 24 hour(s)).

## 2022-05-18 NOTE — PLAN OF CARE
D/I: Many family members at bedside this shift.Zenaida is not responsive, Morphine IV continues at 2 mg/hr continues via R PIV. Ventura in place with small amount dark urine.Turned and oral hygiene Q 2 hours.  P:Continue with cares as ordered.

## 2022-05-19 NOTE — PLAN OF CARE
Pt transferred off floor at 0940. Paperwork completed by RN. Family here this AM, left to make  arrangements.

## 2022-05-19 NOTE — DEATH PRONOUNCEMENT
MD DEATH PRONOUNCEMENT    Called to pronounce Zenaida Brown dead at 05:34 AM.    Physical Exam: Unresponsive to noxious stimuli, Spontaneous respirations absent, Breath sounds absent, Carotid pulse absent, Heart sounds absent, Pupillary light reflex absent and Corneal blink reflex absent    Patient was pronounced dead at 05:40 AM, May 19, 2022.    Preliminary Cause of Death: Subdural hematoma    Principal Problem:    SDH (subdural hematoma) (H)     Infectious disease present?: NO    Communicable disease present? (examples: HIV, chicken pox, TB, Ebola, CJD) :  NO    Multi-drug resistant organism present? (example: MRSA): NO    Please consider an autopsy if any of the following exist:  NO Unexpected or unexplained death during or following any dental, medical, or surgical diagnostic treatment procedures.   NO Death of mother at or up to seven days after delivery.     NO All  and pediatric deaths.     NO Death where the cause is sufficiently obscure to delay completion of the death certificate.   NO Deaths in which autopsy would confirm a suspected illness/condition that would affect surviving family members or recipients of transplanted organs.     The following deaths must be reported to the 's Office:  NO A death that may be due entirely or in part to any factors other than natural disease (recent surgery, recent trauma, suspected abuse/neglect).   NO A death that may be an accident, suicide, or homicide.     NO Any sudden, unexpected death in which there is no prior history of significant heart disease or any other condition associated with sudden death.   NO A death under suspicious, unusual, or unexpected circumstances.    NO Any death which is apparently due to natural causes but in which the  does not have a personal physician familiar with the patient s medical history, social, or environmental situation or the circumstances of the terminal event.   NO Any death apparently due to Sudden  Infant Death Syndrome.     NO Deaths that occur during, in association with, or as consequences of a diagnostic, therapeutic, or anesthetic procedure.   NO Any death in which a fracture of a major bone has occurred within the past (6) six months.   NO A death of persons note seen by their physician within 120 days of demise.     NO Any death in which the  was an inmate of a public institution or was in the custody of Law Enforcement personnel.   NO  All unexpected deaths of children   NO Solid organ donors   NO Unidentified bodies   NO Deaths of persons whose bodies are to be cremated or otherwise disposed of so that the bodies will later be unavailable for examination;   NO Deaths unattended by a physician outside of a licensed healthcare facility or licensed residential hospice program   NO Deaths occurring within 24 hours of arrival to a health care facility if death is unexpected.    NO Deaths associated with the decedent s employment.   NO Deaths attributed to acts of terrorism.   NO Any death in which there is uncertainty as to whether it is a medical examiner s care should be discussed with the medical investigator.        Body disposition: Autopsy decision to be discussed with family when they arrive.     Hayley Severson, MD-MPH  Internal Medicine-Pediatrics PGY-1

## 2022-05-19 NOTE — DISCHARGE SUMMARY
"[facility]  Death/Discharge Summary - Medicine & Pediatrics       Date of Admission:  5/15/2022  Date of Discharge:  2022 5:34 AM  Discharging Provider: Dr. Violet Monreal  Discharge Service: Medicine Service, FILIBERTO TEAM 4    Discharge Diagnoses   Subdural Hematoma  Cerebral Edema  Death  Fall on Warfarin    Follow-ups Needed After Discharge   NA    Unresulted Labs Ordered in the Past 30 Days of this Admission     No orders found from 4/15/2022 to 2022.      These results will be followed up by NA    Discharge Disposition   Patient  during this admission  Condition at discharge:     Hospital Course   Zenaida Brown is a 86 year old female with a past medical history of atrial fibrillation on warfarin, sick sinus syndrome status post dual-chamber pacemaker, MR s/p mitral valve replacement, CAD, HTN, CHERRY admitted on 5/15/2022 after being found to have a 24 mm subdural hematoma complicated by cerebral edema and 7 mm midline shift.The patient demonstrated \"increased obtundation\" at [facility], prompting intubation prior to transfer. After discussion with patient's family, Zenaida was extubated to compassionate comfort cares. Transferred to general medicine on . Palliative consulted. Patient comfortable on morphine drip and  on  at 5:34 AM.     Consultations This Hospital Stay   PALLIATIVE CARE ADULT IP CONSULT  PALLIATIVE CARE ADULT IP CONSULT  CARE MANAGEMENT / SOCIAL WORK IP CONSULT    Code Status   No CPR- Do NOT Intubate       The patient was discussed with Dr. Lazo.    MD [facility] 4 Service  [facility] [address] [address]  Phone: [phone]  ______________________________________________________________________    Physical Exam   Vital Signs:                  Weight: 149 lbs .5 oz  Please see death pronouncement for exam.       Primary Care Physician   Sharron" Printon    Discharge Orders   No discharge procedures on file.    Significant Results and Procedures   CT head 5/15/22:  1.  Acute left holohemispheric subdural hemorrhage measuring up to 24 mm in diameter with areas of internal hyperattenuation suggesting continued active hemorrhage. Associated mass effect produces subtotal effacement left lateral and third ventricles,   early left temporal uncal herniation, and 7 mm rightward midline shift.  2.  Acute bilateral tentorial subdural hematomas within the left posterior parafalcine extension.  3.  Small acute subarachnoid hemorrhage over the right lateral temporal sulci.  4.  No acute calvarial fracture or scalp hematoma.     CT cervical spine 5/15/22:  1.  No evidence for acute fracture or posttraumatic subluxation of the cervical spine by CT imaging.  2.  No high-grade spinal canal or neural foraminal stenosis.  3.  Mild pulmonary emphysema.     CTA CAP 5/15/22:  1.  No acute traumatic visceral, vascular, or acute osseous abnormality.  2.  Emphysema.  3.  Moderately enlarged heart status post median sternotomy.  4.  Diverticulosis. No diverticulitis, colitis, or obstruction.  5.  Right hepatic cyst, no further characterization or follow-up required.    Discharge Medications   Current Discharge Medication List      CONTINUE these medications which have NOT CHANGED    Details   acetaminophen-codeine (TYLENOL #3) 300-30 mg per tablet [ACETAMINOPHEN-CODEINE (TYLENOL #3) 300-30 MG PER TABLET] Take 1-2 tablets by mouth every 6 (six) hours as needed for pain.  Qty: 15 tablet, Refills: 0    Associated Diagnoses: Chronic right shoulder pain; Arthritis           Allergies   No Known Allergies

## 2022-05-19 NOTE — PROGRESS NOTES
Addendum to Discharge Summary dated 5/19/22    # Normocytic anemia due to acute blood loss   L subdural hematoma caused Hgb drop from 13.9 on 2/2022 to 8.1 when measured here on admission.       Florentin Ramachandran MD MPH  Internal Medicine and Pediatrics, PGY-3  [facility]

## 2022-05-19 NOTE — PLAN OF CARE
Admitted for SDH after a fall at home. Comfort cares. PIV morphine gtt 2mg/hr. T/R q2h. Ventura for end of life. Cheynes-cobb breathing. Obtunded, unresponsive. NPO. Bedrest. Continue to monitor.

## 2022-05-19 NOTE — PLAN OF CARE
Pt admitted for SDH after a fall at home. Comfort cares. Morphine gtt infusing at 2mg/hr via PIV. Turn/repo Q2 hours along with oral cares. Ventura in place. No BM. Family present this afternoon, supportive and would like to be called with any change in status